# Patient Record
Sex: MALE | Race: WHITE | Employment: OTHER | RURAL
[De-identification: names, ages, dates, MRNs, and addresses within clinical notes are randomized per-mention and may not be internally consistent; named-entity substitution may affect disease eponyms.]

---

## 2017-03-09 ENCOUNTER — HOSPITAL ENCOUNTER (OUTPATIENT)
Dept: OTHER | Age: 52
Discharge: HOME | End: 2017-03-09
Attending: UROLOGY | Admitting: UROLOGY

## 2017-03-09 DIAGNOSIS — M25.551 PAIN OF BOTH HIP JOINTS: ICD-10-CM

## 2017-03-09 DIAGNOSIS — M25.552 PAIN OF BOTH HIP JOINTS: ICD-10-CM

## 2017-03-09 DIAGNOSIS — N31.1 REFLEX NEUROPATHIC BLADDER, NOT ELSEWHERE CLASSIFIED: ICD-10-CM

## 2017-03-09 LAB
A/G RATIO: 2.3 (ref 0.8–2)
ALBUMIN SERPL-MCNC: 4.8 G/DL (ref 3.4–4.8)
ALP BLD-CCNC: 111 U/L (ref 25–100)
ALT SERPL-CCNC: 23 U/L (ref 4–36)
ANION GAP SERPL CALCULATED.3IONS-SCNC: 10 MMOL/L (ref 3–16)
AST SERPL-CCNC: 20 U/L (ref 8–33)
BILIRUB SERPL-MCNC: 0.3 MG/DL (ref 0.3–1.2)
BUN BLDV-MCNC: 13 MG/DL (ref 6–20)
CALCIUM SERPL-MCNC: 9.6 MG/DL (ref 8.5–10.5)
CHLORIDE BLD-SCNC: 102 MMOL/L (ref 98–107)
CO2: 27 MMOL/L (ref 20–30)
CREAT SERPL-MCNC: 0.7 MG/DL (ref 0.4–1.2)
GFR AFRICAN AMERICAN: >59
GFR NON-AFRICAN AMERICAN: >59
GLOBULIN: 2.1 G/DL
GLUCOSE BLD-MCNC: 121 MG/DL (ref 74–106)
POTASSIUM SERPL-SCNC: 4.3 MMOL/L (ref 3.4–5.1)
PROSTATE SPECIFIC ANTIGEN: 0.2 NG/ML (ref 0–4)
SODIUM BLD-SCNC: 139 MMOL/L (ref 136–145)
TOTAL PROTEIN: 6.9 G/DL (ref 6.4–8.3)

## 2017-05-05 ENCOUNTER — HOSPITAL ENCOUNTER (OUTPATIENT)
Dept: OTHER | Age: 52
Discharge: HOME | End: 2017-05-05
Attending: INTERNAL MEDICINE | Admitting: INTERNAL MEDICINE

## 2017-05-05 LAB
A/G RATIO: 1.9 (ref 0.8–2)
ALBUMIN SERPL-MCNC: 4.7 G/DL (ref 3.4–4.8)
ALP BLD-CCNC: 116 U/L (ref 25–100)
ALT SERPL-CCNC: 29 U/L (ref 4–36)
ANION GAP SERPL CALCULATED.3IONS-SCNC: 14 MMOL/L (ref 3–16)
AST SERPL-CCNC: 25 U/L (ref 8–33)
BASOPHILS ABSOLUTE: 0 K/UL (ref 0–0.1)
BASOPHILS RELATIVE PERCENT: 0.7 %
BILIRUB SERPL-MCNC: 0.3 MG/DL (ref 0.3–1.2)
BILIRUBIN DIRECT: <0.2 MG/DL (ref 0–0.2)
BILIRUBIN, INDIRECT: NORMAL MG/DL (ref 0.2–0.8)
BUN BLDV-MCNC: 15 MG/DL (ref 6–20)
CALCIUM SERPL-MCNC: 9.7 MG/DL (ref 8.5–10.5)
CHLORIDE BLD-SCNC: 102 MMOL/L (ref 98–107)
CHOLESTEROL, TOTAL: 164 MG/DL (ref 0–200)
CO2: 24 MMOL/L (ref 20–30)
CREAT SERPL-MCNC: 0.7 MG/DL (ref 0.4–1.2)
EOSINOPHILS ABSOLUTE: 0.4 K/UL (ref 0–0.4)
EOSINOPHILS RELATIVE PERCENT: 6.8 %
FOLATE: >20 NG/ML
GFR AFRICAN AMERICAN: >59
GFR NON-AFRICAN AMERICAN: >59
GLOBULIN: 2.5 G/DL
GLUCOSE BLD-MCNC: 105 MG/DL (ref 74–106)
HCT VFR BLD CALC: 41.7 % (ref 40–54)
HDLC SERPL-MCNC: 37 MG/DL (ref 40–60)
HEMOGLOBIN: 14.6 G/DL (ref 13–18)
LDL CHOLESTEROL CALCULATED: 102 MG/DL
LYMPHOCYTES ABSOLUTE: 2 K/UL (ref 1.5–4)
LYMPHOCYTES RELATIVE PERCENT: 34.8 % (ref 20–40)
MCH RBC QN AUTO: 31.5 PG (ref 27–32)
MCHC RBC AUTO-ENTMCNC: 35 G/DL (ref 31–35)
MCV RBC AUTO: 89.9 FL (ref 80–100)
MONOCYTES ABSOLUTE: 0.8 K/UL (ref 0.2–0.8)
MONOCYTES RELATIVE PERCENT: 13.8 % (ref 3–10)
NEUTROPHILS ABSOLUTE: 2.6 K/UL (ref 2–7.5)
NEUTROPHILS RELATIVE PERCENT: 43.9 %
PDW BLD-RTO: 13.4 % (ref 11–16)
PLATELET # BLD: 260 K/UL (ref 150–400)
PMV BLD AUTO: 9.6 FL (ref 6–10)
POTASSIUM SERPL-SCNC: 4.3 MMOL/L (ref 3.4–5.1)
PROSTATE SPECIFIC ANTIGEN: 0.2 NG/ML (ref 0–4)
RBC # BLD: 4.64 M/UL (ref 4.5–6)
SODIUM BLD-SCNC: 140 MMOL/L (ref 136–145)
TOTAL PROTEIN: 7.2 G/DL (ref 6.4–8.3)
TRIGL SERPL-MCNC: 125 MG/DL (ref 0–249)
TSH SERPL DL<=0.05 MIU/L-ACNC: 3.38 UIU/ML (ref 0.35–5.5)
VITAMIN B-12: 423 PG/ML (ref 211–911)
VLDLC SERPL CALC-MCNC: 25 MG/DL
WBC # BLD: 5.9 K/UL (ref 4–11)

## 2017-08-01 ENCOUNTER — HOSPITAL ENCOUNTER (OUTPATIENT)
Dept: OTHER | Age: 52
Discharge: HOME | End: 2017-08-01
Attending: NURSE PRACTITIONER | Admitting: NURSE PRACTITIONER

## 2017-08-01 LAB
BASOPHILS ABSOLUTE: 0 K/UL (ref 0–0.1)
BASOPHILS RELATIVE PERCENT: 0.5 %
BILIRUBIN DIRECT: <0.2 MG/DL (ref 0–0.2)
BILIRUBIN, INDIRECT: NORMAL MG/DL (ref 0.2–0.8)
CHOLESTEROL, TOTAL: 134 MG/DL (ref 0–200)
EOSINOPHILS ABSOLUTE: 0.2 K/UL (ref 0–0.4)
EOSINOPHILS RELATIVE PERCENT: 3 %
FOLATE: >20 NG/ML
HCT VFR BLD CALC: 39.8 % (ref 40–54)
HDLC SERPL-MCNC: 40 MG/DL (ref 40–60)
HEMOGLOBIN: 13.8 G/DL (ref 13–18)
LDL CHOLESTEROL CALCULATED: 72 MG/DL
LYMPHOCYTES ABSOLUTE: 2.4 K/UL (ref 1.5–4)
LYMPHOCYTES RELATIVE PERCENT: 31.7 % (ref 20–40)
MCH RBC QN AUTO: 30.9 PG (ref 27–32)
MCHC RBC AUTO-ENTMCNC: 34.7 G/DL (ref 31–35)
MCV RBC AUTO: 89.2 FL (ref 80–100)
MONOCYTES ABSOLUTE: 0.9 K/UL (ref 0.2–0.8)
MONOCYTES RELATIVE PERCENT: 11.5 % (ref 3–10)
NEUTROPHILS ABSOLUTE: 4 K/UL (ref 2–7.5)
NEUTROPHILS RELATIVE PERCENT: 53.3 %
PDW BLD-RTO: 13.6 % (ref 11–16)
PLATELET # BLD: 260 K/UL (ref 150–400)
PMV BLD AUTO: 9.8 FL (ref 6–10)
RBC # BLD: 4.46 M/UL (ref 4.5–6)
TRIGL SERPL-MCNC: 109 MG/DL (ref 0–249)
TSH SERPL DL<=0.05 MIU/L-ACNC: 4.57 UIU/ML (ref 0.35–5.5)
VITAMIN B-12: 543 PG/ML (ref 211–911)
VLDLC SERPL CALC-MCNC: 22 MG/DL
WBC # BLD: 7.5 K/UL (ref 4–11)

## 2017-08-02 LAB
A/G RATIO: 1.8 (ref 0.8–2)
ALBUMIN SERPL-MCNC: 4.8 G/DL (ref 3.4–4.8)
ALP BLD-CCNC: 113 U/L (ref 25–100)
ALT SERPL-CCNC: 38 U/L (ref 4–36)
ANION GAP SERPL CALCULATED.3IONS-SCNC: 15 MMOL/L (ref 3–16)
AST SERPL-CCNC: 27 U/L (ref 8–33)
BILIRUB SERPL-MCNC: 0.4 MG/DL (ref 0.3–1.2)
BUN BLDV-MCNC: 12 MG/DL (ref 6–20)
CALCIUM SERPL-MCNC: 9.6 MG/DL (ref 8.5–10.5)
CHLORIDE BLD-SCNC: 100 MMOL/L (ref 98–107)
CO2: 26 MMOL/L (ref 20–30)
CREAT SERPL-MCNC: 0.8 MG/DL (ref 0.4–1.2)
GFR AFRICAN AMERICAN: >59
GFR NON-AFRICAN AMERICAN: >59
GLOBULIN: 2.6 G/DL
GLUCOSE BLD-MCNC: 112 MG/DL (ref 74–106)
POTASSIUM SERPL-SCNC: 4.9 MMOL/L (ref 3.4–5.1)
SODIUM BLD-SCNC: 141 MMOL/L (ref 136–145)
TOTAL PROTEIN: 7.4 G/DL (ref 6.4–8.3)

## 2017-11-20 ENCOUNTER — HOSPITAL ENCOUNTER (OUTPATIENT)
Dept: OTHER | Age: 52
Discharge: HOME | End: 2017-11-20
Attending: INTERNAL MEDICINE | Admitting: INTERNAL MEDICINE

## 2017-11-20 LAB
ALBUMIN SERPL-MCNC: 4.8 G/DL (ref 3.4–4.8)
ALP BLD-CCNC: 110 U/L (ref 25–100)
ALT SERPL-CCNC: 70 U/L (ref 4–36)
AST SERPL-CCNC: 38 U/L (ref 8–33)
BILIRUB SERPL-MCNC: 0.4 MG/DL (ref 0.3–1.2)
BILIRUBIN DIRECT: <0.2 MG/DL (ref 0–0.2)
BILIRUBIN, INDIRECT: ABNORMAL MG/DL (ref 0.2–0.8)
CHOLESTEROL, TOTAL: 166 MG/DL (ref 0–200)
HDLC SERPL-MCNC: 42 MG/DL (ref 40–60)
LDL CHOLESTEROL CALCULATED: 103 MG/DL
TOTAL PROTEIN: 7.4 G/DL (ref 6.4–8.3)
TRIGL SERPL-MCNC: 103 MG/DL (ref 0–249)
VLDLC SERPL CALC-MCNC: 21 MG/DL

## 2017-12-28 ENCOUNTER — HOSPITAL ENCOUNTER (OUTPATIENT)
Dept: OTHER | Age: 52
Discharge: HOME | End: 2017-12-28
Attending: UROLOGY | Admitting: UROLOGY

## 2017-12-28 DIAGNOSIS — C67.0 MALIGNANT NEOPLASM OF TRIGONE OF URINARY BLADDER (HCC): ICD-10-CM

## 2017-12-28 LAB
A/G RATIO: 1.8 (ref 0.8–2)
ALBUMIN SERPL-MCNC: 4.5 G/DL (ref 3.4–4.8)
ALP BLD-CCNC: 101 U/L (ref 25–100)
ALT SERPL-CCNC: 49 U/L (ref 4–36)
ANION GAP SERPL CALCULATED.3IONS-SCNC: 10 MMOL/L (ref 3–16)
AST SERPL-CCNC: 26 U/L (ref 8–33)
BILIRUB SERPL-MCNC: 0.3 MG/DL (ref 0.3–1.2)
BUN BLDV-MCNC: 13 MG/DL (ref 6–20)
CALCIUM SERPL-MCNC: 9.1 MG/DL (ref 8.5–10.5)
CHLORIDE BLD-SCNC: 99 MMOL/L (ref 98–107)
CO2: 29 MMOL/L (ref 20–30)
CREAT SERPL-MCNC: 0.7 MG/DL (ref 0.4–1.2)
GFR AFRICAN AMERICAN: >59
GFR NON-AFRICAN AMERICAN: >59
GLOBULIN: 2.5 G/DL
GLUCOSE BLD-MCNC: 114 MG/DL (ref 74–106)
POTASSIUM SERPL-SCNC: 4.3 MMOL/L (ref 3.4–5.1)
PROSTATE SPECIFIC ANTIGEN: 0.19 NG/ML (ref 0–4)
SODIUM BLD-SCNC: 138 MMOL/L (ref 136–145)
TOTAL PROTEIN: 7 G/DL (ref 6.4–8.3)

## 2018-01-17 ENCOUNTER — HOSPITAL ENCOUNTER (OUTPATIENT)
Dept: OTHER | Age: 53
Discharge: HOME | End: 2018-01-17
Attending: INTERNAL MEDICINE | Admitting: INTERNAL MEDICINE

## 2018-01-17 LAB
ALBUMIN SERPL-MCNC: 4.6 G/DL (ref 3.4–4.8)
ALP BLD-CCNC: 117 U/L (ref 25–100)
ALT SERPL-CCNC: 35 U/L (ref 4–36)
AST SERPL-CCNC: 24 U/L (ref 8–33)
BILIRUB SERPL-MCNC: 0.5 MG/DL (ref 0.3–1.2)
BILIRUBIN DIRECT: <0.2 MG/DL (ref 0–0.2)
BILIRUBIN, INDIRECT: ABNORMAL MG/DL (ref 0.2–0.8)
CHOLESTEROL, TOTAL: 133 MG/DL (ref 0–200)
HDLC SERPL-MCNC: 37 MG/DL (ref 40–60)
LDL CHOLESTEROL CALCULATED: 80 MG/DL
TOTAL PROTEIN: 7.1 G/DL (ref 6.4–8.3)
TRIGL SERPL-MCNC: 82 MG/DL (ref 0–249)
VLDLC SERPL CALC-MCNC: 16 MG/DL

## 2018-04-16 ENCOUNTER — HOSPITAL ENCOUNTER (OUTPATIENT)
Dept: OTHER | Age: 53
Discharge: HOME | End: 2018-04-16
Attending: UROLOGY | Admitting: UROLOGY

## 2018-04-16 DIAGNOSIS — N21.1: ICD-10-CM

## 2018-04-16 LAB
A/G RATIO: 2.1 (ref 0.8–2)
ALBUMIN SERPL-MCNC: 4.5 G/DL (ref 3.4–4.8)
ALP BLD-CCNC: 109 U/L (ref 25–100)
ALT SERPL-CCNC: 33 U/L (ref 4–36)
ANION GAP SERPL CALCULATED.3IONS-SCNC: 14 MMOL/L (ref 3–16)
AST SERPL-CCNC: 21 U/L (ref 8–33)
BILIRUB SERPL-MCNC: 0.3 MG/DL (ref 0.3–1.2)
BUN BLDV-MCNC: 14 MG/DL (ref 6–20)
CALCIUM SERPL-MCNC: 9.1 MG/DL (ref 8.5–10.5)
CHLORIDE BLD-SCNC: 102 MMOL/L (ref 98–107)
CO2: 25 MMOL/L (ref 20–30)
CREAT SERPL-MCNC: 0.7 MG/DL (ref 0.4–1.2)
GFR AFRICAN AMERICAN: >59
GFR NON-AFRICAN AMERICAN: >59
GLOBULIN: 2.1 G/DL
GLUCOSE BLD-MCNC: 115 MG/DL (ref 74–106)
POTASSIUM SERPL-SCNC: 4 MMOL/L (ref 3.4–5.1)
SODIUM BLD-SCNC: 141 MMOL/L (ref 136–145)
TOTAL PROTEIN: 6.6 G/DL (ref 6.4–8.3)

## 2018-05-08 ENCOUNTER — HOSPITAL ENCOUNTER (OUTPATIENT)
Dept: OTHER | Age: 53
Discharge: HOME | End: 2018-05-08
Attending: UROLOGY | Admitting: UROLOGY

## 2018-05-08 LAB
BILIRUBIN URINE: NEGATIVE
BLOOD, URINE: NEGATIVE
CLARITY: CLEAR
COLOR: YELLOW
GLUCOSE URINE: NEGATIVE MG/DL
KETONES, URINE: NEGATIVE MG/DL
LEUKOCYTE ESTERASE, URINE: NEGATIVE
MICROSCOPIC EXAMINATION: NORMAL
NITRITE, URINE: NEGATIVE
PH UA: 5.5
PROTEIN UA: NEGATIVE MG/DL
SPECIFIC GRAVITY UA: 1.02
URINE TYPE: NORMAL
UROBILINOGEN, URINE: 0.2 E.U./DL

## 2018-05-10 LAB — URINE CULTURE, ROUTINE: NORMAL

## 2018-05-23 RX ORDER — ATORVASTATIN CALCIUM 20 MG/1
20 TABLET, FILM COATED ORAL DAILY
COMMUNITY

## 2018-05-24 ENCOUNTER — OFFICE VISIT (OUTPATIENT)
Dept: SURGERY | Facility: CLINIC | Age: 53
End: 2018-05-24

## 2018-05-24 VITALS
OXYGEN SATURATION: 99 % | DIASTOLIC BLOOD PRESSURE: 89 MMHG | BODY MASS INDEX: 31.48 KG/M2 | HEART RATE: 61 BPM | WEIGHT: 195.9 LBS | HEIGHT: 66 IN | SYSTOLIC BLOOD PRESSURE: 134 MMHG | TEMPERATURE: 97.6 F

## 2018-05-24 DIAGNOSIS — D49.2 SKIN NEOPLASM: Primary | ICD-10-CM

## 2018-05-24 PROCEDURE — 99203 OFFICE O/P NEW LOW 30 MIN: CPT | Performed by: SURGERY

## 2018-05-24 RX ORDER — IBUPROFEN 400 MG/1
TABLET ORAL
Refills: 0 | COMMUNITY
Start: 2018-03-07

## 2018-05-24 RX ORDER — HYDROCODONE BITARTRATE AND ACETAMINOPHEN 10; 325 MG/1; MG/1
TABLET ORAL
Refills: 0 | COMMUNITY
Start: 2018-04-21

## 2018-05-24 RX ORDER — LISINOPRIL 5 MG/1
5 TABLET ORAL DAILY
Refills: 0 | COMMUNITY
Start: 2018-05-03

## 2018-05-24 RX ORDER — PREDNISONE 1 MG/1
TABLET ORAL
Refills: 0 | COMMUNITY
Start: 2018-04-26

## 2018-05-24 RX ORDER — METOPROLOL SUCCINATE 25 MG/1
25 TABLET, EXTENDED RELEASE ORAL DAILY
Refills: 0 | COMMUNITY
Start: 2018-05-03

## 2018-05-24 RX ORDER — FINASTERIDE 5 MG/1
TABLET, FILM COATED ORAL
Refills: 0 | COMMUNITY
Start: 2018-04-26

## 2018-05-24 RX ORDER — ASPIRIN 81 MG/1
81 TABLET ORAL DAILY
COMMUNITY

## 2018-05-24 RX ORDER — ATORVASTATIN CALCIUM 80 MG/1
80 TABLET, FILM COATED ORAL
Refills: 0 | COMMUNITY
Start: 2018-05-03

## 2018-05-24 RX ORDER — DIAZEPAM 10 MG/1
TABLET ORAL
Refills: 0 | COMMUNITY
Start: 2018-05-16

## 2018-05-24 RX ORDER — COLCHICINE 0.6 MG/1
TABLET ORAL
Refills: 0 | COMMUNITY
Start: 2018-03-20

## 2018-05-24 RX ORDER — OMEPRAZOLE 40 MG/1
40 CAPSULE, DELAYED RELEASE ORAL DAILY
Refills: 0 | COMMUNITY
Start: 2018-04-26

## 2018-05-24 NOTE — PROGRESS NOTES
Patient: Buzz Cox    YOB: 1965    Date: 05/24/2018    Primary Care Provider: AHSAN Martin    Chief Complaint   Patient presents with   • Skin Lesion       Subjective .     History of present illness:  The patient is in the office for evaluation of a lesion on the right side of his scalp just at his hairline.  It has been thee 1 year.  He denies increase in size.  His hairdresser has hit it a couple of times and it is very painful when that happens.      The following portions of the patient's history were reviewed and updated as appropriate: allergies, current medications, past family history, past medical history, past social history, past surgical history and problem list.      Review of Systems   Constitutional: Negative for chills, fever and unexpected weight change.   HENT: Negative for trouble swallowing and voice change.    Eyes: Negative for visual disturbance.   Respiratory: Negative for apnea, cough, chest tightness, shortness of breath and wheezing.    Cardiovascular: Negative for chest pain, palpitations and leg swelling.   Gastrointestinal: Negative for abdominal distention, abdominal pain, anal bleeding, blood in stool, constipation, diarrhea, nausea, rectal pain and vomiting.   Endocrine: Negative for cold intolerance and heat intolerance.   Genitourinary: Negative for difficulty urinating, dysuria, flank pain, scrotal swelling and testicular pain.   Musculoskeletal: Negative for back pain, gait problem and joint swelling.   Skin: Negative for color change, rash and wound.   Neurological: Negative for dizziness, syncope, speech difficulty, weakness, numbness and headaches.   Hematological: Negative for adenopathy. Does not bruise/bleed easily.   Psychiatric/Behavioral: Negative for confusion. The patient is not nervous/anxious.        Allergies:  No Known Allergies    Medications:    Current Outpatient Prescriptions:   •  aspirin 81 MG EC tablet, Take 81 mg by mouth Daily.,  "Disp: , Rfl:   •  finasteride (PROSCAR) 5 MG tablet, , Disp: , Rfl: 0  •  lisinopril (PRINIVIL,ZESTRIL) 5 MG tablet, Take 5 mg by mouth Daily., Disp: , Rfl: 0  •  metoprolol succinate XL (TOPROL-XL) 25 MG 24 hr tablet, Take 25 mg by mouth Daily., Disp: , Rfl: 0  •  omeprazole (priLOSEC) 40 MG capsule, Take 40 mg by mouth Daily., Disp: , Rfl: 0  •  predniSONE (DELTASONE) 5 MG tablet, , Disp: , Rfl: 0  •  atorvastatin (LIPITOR) 20 MG tablet, Take 20 mg by mouth Daily., Disp: , Rfl:   •  atorvastatin (LIPITOR) 80 MG tablet, Take 80 mg by mouth every night at bedtime., Disp: , Rfl: 0  •  colchicine 0.6 MG tablet, , Disp: , Rfl: 0  •  diazePAM (VALIUM) 10 MG tablet, take 1 tablet by mouth every 6 hours if needed for BACK PAIN. MAY TAKE 1/2 OR 1 TABLET AS NEEDED, Disp: , Rfl: 0  •  HYDROcodone-acetaminophen (NORCO)  MG per tablet, take 1 to 2 tablets by mouth 30 MINUTES prior to procedure then as directed THEREAFTER, Disp: , Rfl: 0  •  ibuprofen (ADVIL,MOTRIN) 400 MG tablet, , Disp: , Rfl: 0    History\"  Past Medical History:   Diagnosis Date   • Arthritis    • CAD (coronary artery disease)    • Cancer    • Hyperlipidemia    • Hypertension    • Myocardial infarction        Past Surgical History:   Procedure Laterality Date   • BLADDER TUMOR EXCISION     • CARDIAC SURGERY     • CORONARY ANGIOPLASTY WITH STENT PLACEMENT         Family History   Problem Relation Age of Onset   • No Known Problems Mother    • No Known Problems Father        Social History   Substance Use Topics   • Smoking status: Former Smoker   • Smokeless tobacco: Never Used   • Alcohol use No        Objective     Vital Signs:   Vitals:    05/24/18 1005   BP: 134/89   Pulse: 61   Temp: 97.6 °F (36.4 °C)   TempSrc: Temporal Artery    SpO2: 99%   Weight: 88.9 kg (195 lb 14.4 oz)   Height: 167.6 cm (66\")       Physical Exam:   General Appearance:    Alert, cooperative, in no acute distress   Head:    Normocephalic, without obvious abnormality, " atraumatic. 1 cm ulcerated raised lesion right 4 head    Eyes:            Lids and lashes normal, conjunctivae and sclerae normal, no   icterus, no pallor, corneas clear, PERRLA   Ears:    Ears appear intact with no abnormalities noted   Throat:   No oral lesions, no thrush, oral mucosa moist   Neck:   No adenopathy, supple, trachea midline, no thyromegaly, no   carotid bruit, no JVD   Lungs:     Clear to auscultation,respirations regular, even and                  unlabored    Heart:    Regular rhythm and normal rate, normal S1 and S2, no            murmur, no gallop, no rub, no click   Chest Wall:    No abnormalities observed   Abdomen:     Normal bowel sounds, no masses, no organomegaly, soft        non-tender, non-distended, no guarding, no rebound                tenderness   Extremities:   Moves all extremities well, no edema, no cyanosis, no             redness   Pulses:   Pulses palpable and equal bilaterally   Skin:   No bleeding, bruising or rash   Lymph nodes:   No palpable adenopathy   Neurologic:   Cranial nerves 2 - 12 grossly intact, sensation intact, DTR       present and equal bilaterally     Results Review:   I reviewed the patient's new clinical results.    Assessment/Plan     1. Skin neoplasm        He has excision of skin lesion right scalp. Risk of bleeding, infection recurrence discussed and patient agreeable    I discussed the patients findings and my recommendations with patient    Review of Systems was reviewed and confirmed as accurate today.    Electronically signed by Ariel Quintanilla MD  05/24/18      .Scribed for Ariel Quintanilla MD by Michelle Milligan. 5/24/2018  10:26 AM

## 2018-05-30 NOTE — PROGRESS NOTES
Location: scalp lesion     Procedure: excision scalp lesion, 1.2 cm with primary closure      I recommend excision. Procedure and the risks and benefits were explained including bleeding and infection. The patient understands these and wishes to proceed.     The patient was brought to the procedure room. Consent and time out were performed. The area was prepped and draped in the usual fashion. 1% lidocaine with epinephrine was infused locally. An ellyptical incision was made around the lesion. Full thickness excision was performed. The lesion size was 1.2 cm. The wound was closed with Nylon for the skin. Wound closure size was 2 cm. There were no complications and the patient tolerated the procedure well. Hemostasis was well controlled with pressure and there was minimal blood loss. Wound instructions were given.

## 2018-05-31 ENCOUNTER — PROCEDURE VISIT (OUTPATIENT)
Dept: SURGERY | Facility: CLINIC | Age: 53
End: 2018-05-31

## 2018-05-31 VITALS
BODY MASS INDEX: 31.5 KG/M2 | HEART RATE: 58 BPM | DIASTOLIC BLOOD PRESSURE: 68 MMHG | WEIGHT: 196 LBS | OXYGEN SATURATION: 98 % | TEMPERATURE: 97.4 F | SYSTOLIC BLOOD PRESSURE: 110 MMHG | HEIGHT: 66 IN

## 2018-05-31 DIAGNOSIS — D49.2 SKIN NEOPLASM: Primary | ICD-10-CM

## 2018-05-31 DIAGNOSIS — L98.9 LESION OF SKIN OF SCALP: Primary | ICD-10-CM

## 2018-05-31 PROCEDURE — 11442 EXC FACE-MM B9+MARG 1.1-2 CM: CPT | Performed by: SURGERY

## 2018-06-07 ENCOUNTER — OFFICE VISIT (OUTPATIENT)
Dept: SURGERY | Facility: CLINIC | Age: 53
End: 2018-06-07

## 2018-06-07 VITALS
TEMPERATURE: 97.8 F | SYSTOLIC BLOOD PRESSURE: 141 MMHG | BODY MASS INDEX: 31.5 KG/M2 | OXYGEN SATURATION: 99 % | HEIGHT: 66 IN | WEIGHT: 195.99 LBS | HEART RATE: 62 BPM | DIASTOLIC BLOOD PRESSURE: 88 MMHG

## 2018-06-07 DIAGNOSIS — Z48.89 POSTOPERATIVE VISIT: Primary | ICD-10-CM

## 2018-06-07 PROCEDURE — 99024 POSTOP FOLLOW-UP VISIT: CPT | Performed by: SURGERY

## 2018-06-07 NOTE — PROGRESS NOTES
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  They state that they have done well and are having no problems.      Vital Signs:  Vitals:    06/07/18 0844   BP: 141/88   Pulse: 62   Temp: 97.8 °F (36.6 °C)   SpO2: 99%   Weight: 88.9 kg (195 lb 15.8 oz)   Height: 167.6 cm (65.98\")       Physical Exam:   General Appearance:    Alert, cooperative, in no acute distress, wound clean dry without infection   Abdomen:     no masses, no organomegaly, soft non-tender, non-distended, no guarding, wounds are well healed   Chest:      Clear to ausculation     Results Review:   I reviewed the patient's new clinical results.    Assessment / Plan:    1. Postoperative visit        I did discuss the situation with the patient today in the office and they have done well from their recent lesion excision, I don't think that the patient needs any further intervention and I need to see them back only if they have further problems. Pathology report was reviewed with the patient in the office.    Electronically signed by Ariel Quintanilla MD  06/07/18      Portions of this note has been scribed for Ariel Quintanilla MD by Monica Loco. 6/7/2018  10:31 AM                "

## 2018-07-27 ENCOUNTER — HOSPITAL ENCOUNTER (OUTPATIENT)
Facility: HOSPITAL | Age: 53
Discharge: HOME | End: 2018-07-27
Payer: COMMERCIAL

## 2018-07-27 LAB
A/G RATIO: 2 (ref 0.8–2)
ALBUMIN SERPL-MCNC: 4.9 G/DL (ref 3.4–4.8)
ALP BLD-CCNC: 97 U/L (ref 25–100)
ALT SERPL-CCNC: 24 U/L (ref 4–36)
ANION GAP SERPL CALCULATED.3IONS-SCNC: 16 MMOL/L (ref 3–16)
AST SERPL-CCNC: 20 U/L (ref 8–33)
BASOPHILS ABSOLUTE: 0.1 K/UL (ref 0–0.1)
BASOPHILS RELATIVE PERCENT: 1 %
BILIRUB SERPL-MCNC: 0.5 MG/DL (ref 0.3–1.2)
BILIRUBIN DIRECT: <0.2 MG/DL (ref 0–0.2)
BILIRUBIN, INDIRECT: NORMAL MG/DL (ref 0.2–0.8)
BUN BLDV-MCNC: 10 MG/DL (ref 6–20)
CALCIUM SERPL-MCNC: 9.9 MG/DL (ref 8.5–10.5)
CHLORIDE BLD-SCNC: 98 MMOL/L (ref 98–107)
CHOLESTEROL, TOTAL: 148 MG/DL (ref 0–200)
CO2: 25 MMOL/L (ref 20–30)
CREAT SERPL-MCNC: 0.8 MG/DL (ref 0.4–1.2)
EOSINOPHILS ABSOLUTE: 0.2 K/UL (ref 0–0.4)
EOSINOPHILS RELATIVE PERCENT: 1.9 %
FOLATE: >20 NG/ML
GFR AFRICAN AMERICAN: >59
GFR NON-AFRICAN AMERICAN: >59
GLOBULIN: 2.4 G/DL
GLUCOSE BLD-MCNC: 107 MG/DL (ref 74–106)
HCT VFR BLD CALC: 43.9 % (ref 40–54)
HDLC SERPL-MCNC: 45 MG/DL (ref 40–60)
HEMOGLOBIN: 13.9 G/DL (ref 13–18)
IMMATURE GRANULOCYTES #: 0.1 K/UL
IMMATURE GRANULOCYTES %: 0.7 % (ref 0–5)
LDL CHOLESTEROL CALCULATED: 68 MG/DL
LYMPHOCYTES ABSOLUTE: 2.6 K/UL (ref 1.5–4)
LYMPHOCYTES RELATIVE PERCENT: 28 %
MCH RBC QN AUTO: 30 PG (ref 27–32)
MCHC RBC AUTO-ENTMCNC: 31.7 G/DL (ref 31–35)
MCV RBC AUTO: 94.8 FL (ref 80–100)
MONOCYTES ABSOLUTE: 0.8 K/UL (ref 0.2–0.8)
MONOCYTES RELATIVE PERCENT: 8.6 %
NEUTROPHILS ABSOLUTE: 5.6 K/UL (ref 2–7.5)
NEUTROPHILS RELATIVE PERCENT: 59.8 %
PDW BLD-RTO: 13.4 % (ref 11–16)
PLATELET # BLD: 304 K/UL (ref 150–400)
PMV BLD AUTO: 10.2 FL (ref 6–10)
POTASSIUM SERPL-SCNC: 4.1 MMOL/L (ref 3.4–5.1)
PROSTATE SPECIFIC ANTIGEN: 0.26 NG/ML (ref 0–4)
RBC # BLD: 4.63 M/UL (ref 4.5–6)
SODIUM BLD-SCNC: 139 MMOL/L (ref 136–145)
TOTAL PROTEIN: 7.3 G/DL (ref 6.4–8.3)
TRIGL SERPL-MCNC: 174 MG/DL (ref 0–249)
TSH SERPL DL<=0.05 MIU/L-ACNC: 4.31 UIU/ML (ref 0.35–5.5)
VITAMIN B-12: 460 PG/ML (ref 211–911)
VLDLC SERPL CALC-MCNC: 35 MG/DL
WBC # BLD: 9.4 K/UL (ref 4–11)

## 2018-07-27 PROCEDURE — 85025 COMPLETE CBC W/AUTO DIFF WBC: CPT

## 2018-07-27 PROCEDURE — 82607 VITAMIN B-12: CPT

## 2018-07-27 PROCEDURE — 82746 ASSAY OF FOLIC ACID SERUM: CPT

## 2018-07-27 PROCEDURE — 36415 COLL VENOUS BLD VENIPUNCTURE: CPT

## 2018-07-27 PROCEDURE — 80053 COMPREHEN METABOLIC PANEL: CPT

## 2018-07-27 PROCEDURE — 82248 BILIRUBIN DIRECT: CPT

## 2018-07-27 PROCEDURE — 80061 LIPID PANEL: CPT

## 2018-07-27 PROCEDURE — 84443 ASSAY THYROID STIM HORMONE: CPT

## 2018-07-27 PROCEDURE — G0103 PSA SCREENING: HCPCS

## 2018-10-08 ENCOUNTER — HOSPITAL ENCOUNTER (OUTPATIENT)
Facility: HOSPITAL | Age: 53
Discharge: HOME | End: 2018-10-08
Payer: COMMERCIAL

## 2018-10-08 LAB
A/G RATIO: 2.4 (ref 0.8–2)
ALBUMIN SERPL-MCNC: 4.5 G/DL (ref 3.4–4.8)
ALP BLD-CCNC: 60 U/L (ref 25–100)
ALT SERPL-CCNC: 31 U/L (ref 4–36)
ANION GAP SERPL CALCULATED.3IONS-SCNC: 11 MMOL/L (ref 3–16)
AST SERPL-CCNC: 19 U/L (ref 8–33)
BILIRUB SERPL-MCNC: 0.3 MG/DL (ref 0.3–1.2)
BUN BLDV-MCNC: 9 MG/DL (ref 6–20)
CALCIUM SERPL-MCNC: 9.2 MG/DL (ref 8.5–10.5)
CHLORIDE BLD-SCNC: 103 MMOL/L (ref 98–107)
CO2: 26 MMOL/L (ref 20–30)
CREAT SERPL-MCNC: 0.7 MG/DL (ref 0.4–1.2)
GFR AFRICAN AMERICAN: >59
GFR NON-AFRICAN AMERICAN: >59
GLOBULIN: 1.9 G/DL
GLUCOSE BLD-MCNC: 105 MG/DL (ref 74–106)
POTASSIUM SERPL-SCNC: 3.9 MMOL/L (ref 3.4–5.1)
PROSTATE SPECIFIC ANTIGEN: 0.25 NG/ML (ref 0–4)
SODIUM BLD-SCNC: 140 MMOL/L (ref 136–145)
TOTAL PROTEIN: 6.4 G/DL (ref 6.4–8.3)

## 2018-10-08 PROCEDURE — 36415 COLL VENOUS BLD VENIPUNCTURE: CPT

## 2018-10-08 PROCEDURE — 80053 COMPREHEN METABOLIC PANEL: CPT

## 2019-01-29 ENCOUNTER — HOSPITAL ENCOUNTER (OUTPATIENT)
Facility: HOSPITAL | Age: 54
Discharge: HOME | End: 2019-01-29
Payer: COMMERCIAL

## 2019-01-29 LAB
A/G RATIO: 2.4 (ref 0.8–2)
ALBUMIN SERPL-MCNC: 4.7 G/DL (ref 3.4–4.8)
ALP BLD-CCNC: 81 U/L (ref 25–100)
ALT SERPL-CCNC: 27 U/L (ref 4–36)
ANION GAP SERPL CALCULATED.3IONS-SCNC: 13 MMOL/L (ref 3–16)
AST SERPL-CCNC: 21 U/L (ref 8–33)
BASOPHILS ABSOLUTE: 0.1 K/UL (ref 0–0.1)
BASOPHILS RELATIVE PERCENT: 0.5 %
BILIRUB SERPL-MCNC: 0.5 MG/DL (ref 0.3–1.2)
BILIRUBIN DIRECT: <0.2 MG/DL (ref 0–0.2)
BUN BLDV-MCNC: 12 MG/DL (ref 6–20)
CALCIUM SERPL-MCNC: 9.4 MG/DL (ref 8.5–10.5)
CHLORIDE BLD-SCNC: 99 MMOL/L (ref 98–107)
CHOLESTEROL, TOTAL: 133 MG/DL (ref 0–200)
CO2: 25 MMOL/L (ref 20–30)
CREAT SERPL-MCNC: 0.8 MG/DL (ref 0.4–1.2)
EOSINOPHILS ABSOLUTE: 0.1 K/UL (ref 0–0.4)
EOSINOPHILS RELATIVE PERCENT: 1.5 %
GFR AFRICAN AMERICAN: >59
GFR NON-AFRICAN AMERICAN: >59
GLOBULIN: 2 G/DL
GLUCOSE BLD-MCNC: 97 MG/DL (ref 74–106)
HCT VFR BLD CALC: 41.5 % (ref 40–54)
HDLC SERPL-MCNC: 46 MG/DL (ref 40–60)
HEMOGLOBIN: 13.8 G/DL (ref 13–18)
IMMATURE GRANULOCYTES #: 0 K/UL
IMMATURE GRANULOCYTES %: 0.4 % (ref 0–5)
LDL CHOLESTEROL CALCULATED: 64 MG/DL
LYMPHOCYTES ABSOLUTE: 2.5 K/UL (ref 1.5–4)
LYMPHOCYTES RELATIVE PERCENT: 26.8 %
MCH RBC QN AUTO: 31.3 PG (ref 27–32)
MCHC RBC AUTO-ENTMCNC: 33.3 G/DL (ref 31–35)
MCV RBC AUTO: 94.1 FL (ref 80–100)
MONOCYTES ABSOLUTE: 0.9 K/UL (ref 0.2–0.8)
MONOCYTES RELATIVE PERCENT: 9.5 %
NEUTROPHILS ABSOLUTE: 5.7 K/UL (ref 2–7.5)
NEUTROPHILS RELATIVE PERCENT: 61.3 %
PDW BLD-RTO: 12.7 % (ref 11–16)
PLATELET # BLD: 268 K/UL (ref 150–400)
PMV BLD AUTO: 9.8 FL (ref 6–10)
POTASSIUM SERPL-SCNC: 4.1 MMOL/L (ref 3.4–5.1)
RBC # BLD: 4.41 M/UL (ref 4.5–6)
SODIUM BLD-SCNC: 137 MMOL/L (ref 136–145)
TOTAL PROTEIN: 6.7 G/DL (ref 6.4–8.3)
TRIGL SERPL-MCNC: 115 MG/DL (ref 0–249)
VLDLC SERPL CALC-MCNC: 23 MG/DL
WBC # BLD: 9.4 K/UL (ref 4–11)

## 2019-01-29 PROCEDURE — 80053 COMPREHEN METABOLIC PANEL: CPT

## 2019-01-29 PROCEDURE — 80061 LIPID PANEL: CPT

## 2019-01-29 PROCEDURE — 82248 BILIRUBIN DIRECT: CPT

## 2019-01-29 PROCEDURE — 85025 COMPLETE CBC W/AUTO DIFF WBC: CPT

## 2019-02-26 ENCOUNTER — HOSPITAL ENCOUNTER (OUTPATIENT)
Facility: HOSPITAL | Age: 54
Discharge: HOME | End: 2019-02-26
Payer: COMMERCIAL

## 2019-02-26 LAB
HAV IGM SER IA-ACNC: NORMAL
HEPATITIS B CORE IGM ANTIBODY: NORMAL
HEPATITIS B SURFACE ANTIGEN INTERPRETATION: NORMAL
HEPATITIS C ANTIBODY INTERPRETATION: NORMAL

## 2019-02-26 PROCEDURE — 80074 ACUTE HEPATITIS PANEL: CPT

## 2019-02-26 PROCEDURE — 36415 COLL VENOUS BLD VENIPUNCTURE: CPT

## 2019-03-06 ENCOUNTER — HOSPITAL ENCOUNTER (OUTPATIENT)
Facility: HOSPITAL | Age: 54
Discharge: HOME | End: 2019-03-06
Payer: COMMERCIAL

## 2019-03-06 LAB
RAPID INFLUENZA  B AGN: NEGATIVE
RAPID INFLUENZA A AGN: NEGATIVE

## 2019-03-06 PROCEDURE — 87804 INFLUENZA ASSAY W/OPTIC: CPT

## 2019-04-03 ENCOUNTER — HOSPITAL ENCOUNTER (OUTPATIENT)
Facility: HOSPITAL | Age: 54
Discharge: HOME | End: 2019-04-03
Payer: COMMERCIAL

## 2019-04-03 ENCOUNTER — HOSPITAL ENCOUNTER (OUTPATIENT)
Dept: GENERAL RADIOLOGY | Facility: HOSPITAL | Age: 54
Discharge: HOME | End: 2019-04-03
Payer: COMMERCIAL

## 2019-04-03 DIAGNOSIS — N40.1 ENLARGED PROSTATE WITH URINARY OBSTRUCTION: ICD-10-CM

## 2019-04-03 DIAGNOSIS — N13.8 ENLARGED PROSTATE WITH URINARY OBSTRUCTION: ICD-10-CM

## 2019-04-03 LAB
A/G RATIO: 2 (ref 0.8–2)
ALBUMIN SERPL-MCNC: 4.2 G/DL (ref 3.4–4.8)
ALP BLD-CCNC: 69 U/L (ref 25–100)
ALT SERPL-CCNC: 26 U/L (ref 4–36)
ANION GAP SERPL CALCULATED.3IONS-SCNC: 11 MMOL/L (ref 3–16)
AST SERPL-CCNC: 19 U/L (ref 8–33)
BILIRUB SERPL-MCNC: 0.3 MG/DL (ref 0.3–1.2)
BUN BLDV-MCNC: 16 MG/DL (ref 6–20)
CALCIUM SERPL-MCNC: 9.1 MG/DL (ref 8.5–10.5)
CHLORIDE BLD-SCNC: 106 MMOL/L (ref 98–107)
CO2: 25 MMOL/L (ref 20–30)
CREAT SERPL-MCNC: 0.7 MG/DL (ref 0.4–1.2)
GFR AFRICAN AMERICAN: >59
GFR NON-AFRICAN AMERICAN: >59
GLOBULIN: 2.1 G/DL
GLUCOSE BLD-MCNC: 110 MG/DL (ref 74–106)
POTASSIUM SERPL-SCNC: 4.3 MMOL/L (ref 3.4–5.1)
PROSTATE SPECIFIC ANTIGEN: 0.14 NG/ML (ref 0–4)
SODIUM BLD-SCNC: 142 MMOL/L (ref 136–145)
TOTAL PROTEIN: 6.3 G/DL (ref 6.4–8.3)

## 2019-04-03 PROCEDURE — 74018 RADEX ABDOMEN 1 VIEW: CPT

## 2019-04-03 PROCEDURE — 84153 ASSAY OF PSA TOTAL: CPT

## 2019-04-03 PROCEDURE — 80053 COMPREHEN METABOLIC PANEL: CPT

## 2019-04-03 PROCEDURE — 36415 COLL VENOUS BLD VENIPUNCTURE: CPT

## 2019-05-02 ENCOUNTER — HOSPITAL ENCOUNTER (OUTPATIENT)
Facility: HOSPITAL | Age: 54
Discharge: HOME | End: 2019-05-02
Payer: COMMERCIAL

## 2019-05-02 LAB
A/G RATIO: 1.9 (ref 0.8–2)
ALBUMIN SERPL-MCNC: 4.5 G/DL (ref 3.4–4.8)
ALP BLD-CCNC: 94 U/L (ref 25–100)
ALT SERPL-CCNC: 32 U/L (ref 4–36)
ANION GAP SERPL CALCULATED.3IONS-SCNC: 12 MMOL/L (ref 3–16)
AST SERPL-CCNC: 26 U/L (ref 8–33)
BILIRUB SERPL-MCNC: 0.3 MG/DL (ref 0.3–1.2)
BILIRUBIN DIRECT: <0.2 MG/DL (ref 0–0.2)
BUN BLDV-MCNC: 14 MG/DL (ref 6–20)
CALCIUM SERPL-MCNC: 9.4 MG/DL (ref 8.5–10.5)
CHLORIDE BLD-SCNC: 103 MMOL/L (ref 98–107)
CHOLESTEROL, TOTAL: 117 MG/DL (ref 0–200)
CO2: 23 MMOL/L (ref 20–30)
CREAT SERPL-MCNC: 0.8 MG/DL (ref 0.4–1.2)
GFR AFRICAN AMERICAN: >59
GFR NON-AFRICAN AMERICAN: >59
GLOBULIN: 2.4 G/DL
GLUCOSE BLD-MCNC: 115 MG/DL (ref 74–106)
HCT VFR BLD CALC: 40.4 % (ref 40–54)
HDLC SERPL-MCNC: 36 MG/DL (ref 40–60)
HEMOGLOBIN: 13.7 G/DL (ref 13–18)
LDL CHOLESTEROL CALCULATED: 66 MG/DL
MCH RBC QN AUTO: 31.1 PG (ref 27–32)
MCHC RBC AUTO-ENTMCNC: 33.9 G/DL (ref 31–35)
MCV RBC AUTO: 91.6 FL (ref 80–100)
PDW BLD-RTO: 13 % (ref 11–16)
PLATELET # BLD: 263 K/UL (ref 150–400)
PMV BLD AUTO: 9.7 FL (ref 6–10)
POTASSIUM SERPL-SCNC: 4.4 MMOL/L (ref 3.4–5.1)
RBC # BLD: 4.41 M/UL (ref 4.5–6)
SODIUM BLD-SCNC: 138 MMOL/L (ref 136–145)
TOTAL PROTEIN: 6.9 G/DL (ref 6.4–8.3)
TRIGL SERPL-MCNC: 74 MG/DL (ref 0–249)
VLDLC SERPL CALC-MCNC: 15 MG/DL
WBC # BLD: 6.4 K/UL (ref 4–11)

## 2019-05-02 PROCEDURE — 82248 BILIRUBIN DIRECT: CPT

## 2019-05-02 PROCEDURE — 80053 COMPREHEN METABOLIC PANEL: CPT

## 2019-05-02 PROCEDURE — 36415 COLL VENOUS BLD VENIPUNCTURE: CPT

## 2019-05-02 PROCEDURE — 80061 LIPID PANEL: CPT

## 2019-05-02 PROCEDURE — 85027 COMPLETE CBC AUTOMATED: CPT

## 2019-07-24 ENCOUNTER — HOSPITAL ENCOUNTER (OUTPATIENT)
Facility: HOSPITAL | Age: 54
Discharge: HOME | End: 2019-07-24
Payer: COMMERCIAL

## 2019-07-24 LAB
A/G RATIO: 2.6 (ref 0.8–2)
ALBUMIN SERPL-MCNC: 4.7 G/DL (ref 3.4–4.8)
ALP BLD-CCNC: 92 U/L (ref 25–100)
ALT SERPL-CCNC: 27 U/L (ref 4–36)
ANION GAP SERPL CALCULATED.3IONS-SCNC: 15 MMOL/L (ref 3–16)
AST SERPL-CCNC: 20 U/L (ref 8–33)
BASOPHILS ABSOLUTE: 0.1 K/UL (ref 0–0.1)
BASOPHILS RELATIVE PERCENT: 0.8 %
BILIRUB SERPL-MCNC: 0.4 MG/DL (ref 0.3–1.2)
BUN BLDV-MCNC: 10 MG/DL (ref 6–20)
CALCIUM SERPL-MCNC: 9.3 MG/DL (ref 8.5–10.5)
CHLORIDE BLD-SCNC: 102 MMOL/L (ref 98–107)
CO2: 23 MMOL/L (ref 20–30)
CREAT SERPL-MCNC: 0.8 MG/DL (ref 0.4–1.2)
EOSINOPHILS ABSOLUTE: 0.2 K/UL (ref 0–0.4)
EOSINOPHILS RELATIVE PERCENT: 2.8 %
GFR AFRICAN AMERICAN: >59
GFR NON-AFRICAN AMERICAN: >59
GLOBULIN: 1.8 G/DL
GLUCOSE BLD-MCNC: 112 MG/DL (ref 74–106)
HCT VFR BLD CALC: 38.4 % (ref 40–54)
HEMOGLOBIN: 12.9 G/DL (ref 13–18)
IMMATURE GRANULOCYTES #: 0 K/UL
IMMATURE GRANULOCYTES %: 0.4 % (ref 0–5)
LYMPHOCYTES ABSOLUTE: 2 K/UL (ref 1.5–4)
LYMPHOCYTES RELATIVE PERCENT: 25.5 %
MCH RBC QN AUTO: 31.5 PG (ref 27–32)
MCHC RBC AUTO-ENTMCNC: 33.6 G/DL (ref 31–35)
MCV RBC AUTO: 93.7 FL (ref 80–100)
MONOCYTES ABSOLUTE: 0.9 K/UL (ref 0.2–0.8)
MONOCYTES RELATIVE PERCENT: 11 %
NEUTROPHILS ABSOLUTE: 4.7 K/UL (ref 2–7.5)
NEUTROPHILS RELATIVE PERCENT: 59.5 %
PDW BLD-RTO: 13.9 % (ref 11–16)
PLATELET # BLD: 245 K/UL (ref 150–400)
PMV BLD AUTO: 9.9 FL (ref 6–10)
POTASSIUM SERPL-SCNC: 4.1 MMOL/L (ref 3.4–5.1)
RBC # BLD: 4.1 M/UL (ref 4.5–6)
SODIUM BLD-SCNC: 140 MMOL/L (ref 136–145)
TOTAL PROTEIN: 6.5 G/DL (ref 6.4–8.3)
WBC # BLD: 7.9 K/UL (ref 4–11)

## 2019-07-24 PROCEDURE — 36415 COLL VENOUS BLD VENIPUNCTURE: CPT

## 2019-07-24 PROCEDURE — 85025 COMPLETE CBC W/AUTO DIFF WBC: CPT

## 2019-07-24 PROCEDURE — 80053 COMPREHEN METABOLIC PANEL: CPT

## 2019-07-29 ENCOUNTER — HOSPITAL ENCOUNTER (OUTPATIENT)
Facility: HOSPITAL | Age: 54
Discharge: HOME | End: 2019-07-29
Payer: COMMERCIAL

## 2019-07-29 LAB
A/G RATIO: 1.9 (ref 0.8–2)
ALBUMIN SERPL-MCNC: 4.9 G/DL (ref 3.4–4.8)
ALP BLD-CCNC: 99 U/L (ref 25–100)
ALT SERPL-CCNC: 25 U/L (ref 4–36)
ANION GAP SERPL CALCULATED.3IONS-SCNC: 12 MMOL/L (ref 3–16)
APTT: 23.5 SEC (ref 22.2–27.5)
AST SERPL-CCNC: 20 U/L (ref 8–33)
BASOPHILS ABSOLUTE: 0.1 K/UL (ref 0–0.1)
BASOPHILS RELATIVE PERCENT: 0.9 %
BILIRUB SERPL-MCNC: 0.4 MG/DL (ref 0.3–1.2)
BUN BLDV-MCNC: 11 MG/DL (ref 6–20)
CALCIUM SERPL-MCNC: 9.5 MG/DL (ref 8.5–10.5)
CHLORIDE BLD-SCNC: 101 MMOL/L (ref 98–107)
CO2: 24 MMOL/L (ref 20–30)
CREAT SERPL-MCNC: 0.7 MG/DL (ref 0.4–1.2)
EOSINOPHILS ABSOLUTE: 0.2 K/UL (ref 0–0.4)
EOSINOPHILS RELATIVE PERCENT: 2.3 %
GFR AFRICAN AMERICAN: >59
GFR NON-AFRICAN AMERICAN: >59
GLOBULIN: 2.6 G/DL
GLUCOSE BLD-MCNC: 108 MG/DL (ref 74–106)
HCT VFR BLD CALC: 41.8 % (ref 40–54)
HEMOGLOBIN: 14.1 G/DL (ref 13–18)
IMMATURE GRANULOCYTES #: 0.1 K/UL
IMMATURE GRANULOCYTES %: 0.6 % (ref 0–5)
INR BLD: 1 (ref 0.94–1.06)
LYMPHOCYTES ABSOLUTE: 2.1 K/UL (ref 1.5–4)
LYMPHOCYTES RELATIVE PERCENT: 26.7 %
MCH RBC QN AUTO: 31.5 PG (ref 27–32)
MCHC RBC AUTO-ENTMCNC: 33.7 G/DL (ref 31–35)
MCV RBC AUTO: 93.3 FL (ref 80–100)
MONOCYTES ABSOLUTE: 0.8 K/UL (ref 0.2–0.8)
MONOCYTES RELATIVE PERCENT: 10 %
NEUTROPHILS ABSOLUTE: 4.7 K/UL (ref 2–7.5)
NEUTROPHILS RELATIVE PERCENT: 59.5 %
PDW BLD-RTO: 13.7 % (ref 11–16)
PLATELET # BLD: 267 K/UL (ref 150–400)
PMV BLD AUTO: 9.2 FL (ref 6–10)
POTASSIUM SERPL-SCNC: 4.1 MMOL/L (ref 3.4–5.1)
PROTHROMBIN TIME: 9.6 SEC (ref 9.4–10.6)
RBC # BLD: 4.48 M/UL (ref 4.5–6)
SODIUM BLD-SCNC: 137 MMOL/L (ref 136–145)
TOTAL PROTEIN: 7.5 G/DL (ref 6.4–8.3)
WBC # BLD: 7.8 K/UL (ref 4–11)

## 2019-07-29 PROCEDURE — 85610 PROTHROMBIN TIME: CPT

## 2019-07-29 PROCEDURE — 85025 COMPLETE CBC W/AUTO DIFF WBC: CPT

## 2019-07-29 PROCEDURE — 36415 COLL VENOUS BLD VENIPUNCTURE: CPT

## 2019-07-29 PROCEDURE — 85730 THROMBOPLASTIN TIME PARTIAL: CPT

## 2019-07-29 PROCEDURE — 80053 COMPREHEN METABOLIC PANEL: CPT

## 2019-07-29 PROCEDURE — 93005 ELECTROCARDIOGRAM TRACING: CPT

## 2019-09-03 ENCOUNTER — HOSPITAL ENCOUNTER (OUTPATIENT)
Dept: PHYSICAL THERAPY | Facility: HOSPITAL | Age: 54
Setting detail: THERAPIES SERIES
Discharge: HOME | End: 2019-09-03
Payer: COMMERCIAL

## 2019-09-03 PROCEDURE — 97140 MANUAL THERAPY 1/> REGIONS: CPT

## 2019-09-03 PROCEDURE — 97110 THERAPEUTIC EXERCISES: CPT

## 2019-09-03 PROCEDURE — 97161 PT EVAL LOW COMPLEX 20 MIN: CPT

## 2019-09-03 ASSESSMENT — PAIN DESCRIPTION - FREQUENCY: FREQUENCY: CONTINUOUS

## 2019-09-03 ASSESSMENT — PAIN DESCRIPTION - ORIENTATION: ORIENTATION: RIGHT

## 2019-09-03 ASSESSMENT — PAIN DESCRIPTION - LOCATION: LOCATION: SHOULDER

## 2019-09-03 ASSESSMENT — PAIN SCALES - GENERAL: PAINLEVEL_OUTOF10: 3

## 2019-09-03 ASSESSMENT — PAIN DESCRIPTION - ONSET: ONSET: ON-GOING

## 2019-09-03 ASSESSMENT — PAIN DESCRIPTION - PROGRESSION: CLINICAL_PROGRESSION: GRADUALLY IMPROVING

## 2019-09-03 ASSESSMENT — PAIN DESCRIPTION - PAIN TYPE: TYPE: SURGICAL PAIN

## 2019-09-05 ENCOUNTER — HOSPITAL ENCOUNTER (OUTPATIENT)
Dept: PHYSICAL THERAPY | Facility: HOSPITAL | Age: 54
Setting detail: THERAPIES SERIES
Discharge: HOME | End: 2019-09-05
Payer: COMMERCIAL

## 2019-09-05 PROCEDURE — 97110 THERAPEUTIC EXERCISES: CPT

## 2019-09-05 PROCEDURE — 97140 MANUAL THERAPY 1/> REGIONS: CPT

## 2019-09-05 NOTE — FLOWSHEET NOTE
Physical Therapy Daily Treatment Note   Date:  2019    TIme In:   1330                   Time Out:  0    Patient Name:  Eladio Terrazas    :  1965  MRN: [id number]    Restrictions/Precautions:    Pertinent Medical History:  Medical/Treatment Diagnosis Information:  ·   s/p right shoulder ATS with biceps tenodesis; joint pain, shoulder stiffness, muscle weakness  ·    Insurance/Certification information:    [facility]  Physician Information:    Yanet Mcgee MD  Plan of care signed (Y/N):    Visit# / total visits:   2/    G-Code (if applicable):      Date / Visit # G-Code Applied:         Progress Note: []  Yes  [x]  No  Next due by: Visit #10      Pain level:  6 /10    Subjective:  Shoulder felt better after initial visit; still pretty sore. Objective:  Observation:   Test measurements:    Palpation:    Exercises:  Exercise Resistance/Repetitions Other comments   scap squeezes 2 x 15 5   Seated neutral IR, ER 2 x 30 5   Pulleys: scaption 3' x 2 5   Elbow, wrist AROM 30x 5   Supine self AAROM 3 x 10 5   Pendulum 30x cw, ccw 5                                   Other Therapeutic Activities:      Manual Treatments:  Right shoulder grade 1-3 mobs,PROM x 12'    Modalities:   Ice prn for pain x 12'      Timed Code Treatment Minutes:  36'      Total Treatment Minutes:  62'    Treatment/Activity Tolerance:  [x] Patient tolerated treatment well [] Patient limited by fatigue  [] Patient limited by pain  [] Patient limited by other medical complications  [] Other:     Pain after treatment:    0  /10    Prognosis: [] Good [] Fair  [] Poor    Patient Requires Follow-up: [x] Yes  [] No    Plan:   [x] Continue per plan of care [] Alter current plan (see comments)  [] Plan of care initiated [] Hold pending MD visit [] Discharge    Plan for Next Session:        Electronically signed by:  Unique Gordon PT

## 2019-09-09 ENCOUNTER — HOSPITAL ENCOUNTER (OUTPATIENT)
Dept: PHYSICAL THERAPY | Facility: HOSPITAL | Age: 54
Setting detail: THERAPIES SERIES
Discharge: HOME | End: 2019-09-09
Payer: COMMERCIAL

## 2019-09-09 PROCEDURE — 97140 MANUAL THERAPY 1/> REGIONS: CPT

## 2019-09-09 PROCEDURE — 97110 THERAPEUTIC EXERCISES: CPT

## 2019-09-09 NOTE — FLOWSHEET NOTE
Physical Therapy Daily Treatment Note   Date:  2019    TIme In:   826                   Time Out:  908    Patient Name:  Mike Hill    :  1965  MRN: [id number]    Restrictions/Precautions:    Pertinent Medical History:  Medical/Treatment Diagnosis Information:  ·   s/p right shoulder ATS with biceps tenodesis; joint pain, shoulder stiffness, muscle weakness     Insurance/Certification information:    [facility]  Physician Information:    Solange Woods MD  Plan of care signed (Y/N):    Visit# / total visits:   3 /    G-Code (if applicable):      Date / Visit # G-Code Applied:         Progress Note: []  Yes  [x]  No  Next due by: Visit #10      Pain level:  0 /10    Subjective:  Feels pretty good this morning; but had a lot of pain over the weekend - thinks he may have slept on his shoulder. Objective:  Observation:   Test measurements:    Palpation:    Exercises:  Exercise Resistance/Repetitions Other comments   scap squeezes 2 x 15 9   Seated neutral IR, ER 2 x 30 9   Pulleys: scaption 3' x 2 9   Elbow, wrist AROM 30x 9   Supine self AAROM 3 x 10 9   Pendulum 30x cw, ccw 9        Right shoulder isometrics: ext, IR, ER 5\" x 15 * 9                         Other Therapeutic Activities:      Manual Treatments:  Right shoulder grade 1-3 mobs,PROM x 12'    Modalities:   Ice prn for pain x 12' - patient declined      Timed Code Treatment Minutes:  36'      Total Treatment Minutes: 43 '    Treatment/Activity Tolerance:  [x] Patient tolerated treatment well [] Patient limited by fatigue  [] Patient limited by pain  [] Patient limited by other medical complications  [] Other:     Pain after treatment:    4 /10    Prognosis: [x] Good [] Fair  [] Poor    Patient Requires Follow-up: [x] Yes  [] No    Plan:   [x] Continue per plan of care [] Alter current plan (see comments)  [] Plan of care initiated [] Hold pending MD visit [] Discharge    Plan for Next Session:        Electronically signed by:  Fortino Chavez

## 2019-09-11 ENCOUNTER — HOSPITAL ENCOUNTER (OUTPATIENT)
Dept: PHYSICAL THERAPY | Facility: HOSPITAL | Age: 54
Setting detail: THERAPIES SERIES
Discharge: HOME | End: 2019-09-11
Payer: COMMERCIAL

## 2019-09-11 PROCEDURE — 97110 THERAPEUTIC EXERCISES: CPT

## 2019-09-11 PROCEDURE — 97140 MANUAL THERAPY 1/> REGIONS: CPT

## 2019-09-16 ENCOUNTER — HOSPITAL ENCOUNTER (OUTPATIENT)
Dept: PHYSICAL THERAPY | Facility: HOSPITAL | Age: 54
Setting detail: THERAPIES SERIES
Discharge: HOME | End: 2019-09-16
Payer: COMMERCIAL

## 2019-09-16 PROCEDURE — 97140 MANUAL THERAPY 1/> REGIONS: CPT

## 2019-09-16 PROCEDURE — 97110 THERAPEUTIC EXERCISES: CPT

## 2019-09-18 ENCOUNTER — HOSPITAL ENCOUNTER (OUTPATIENT)
Dept: PHYSICAL THERAPY | Facility: HOSPITAL | Age: 54
Setting detail: THERAPIES SERIES
Discharge: HOME | End: 2019-09-18
Payer: COMMERCIAL

## 2019-09-18 PROCEDURE — 97140 MANUAL THERAPY 1/> REGIONS: CPT

## 2019-09-18 PROCEDURE — 97110 THERAPEUTIC EXERCISES: CPT

## 2019-09-23 ENCOUNTER — HOSPITAL ENCOUNTER (OUTPATIENT)
Dept: PHYSICAL THERAPY | Facility: HOSPITAL | Age: 54
Setting detail: THERAPIES SERIES
Discharge: HOME | End: 2019-09-23
Payer: COMMERCIAL

## 2019-09-23 PROCEDURE — 97110 THERAPEUTIC EXERCISES: CPT

## 2019-09-23 PROCEDURE — 97140 MANUAL THERAPY 1/> REGIONS: CPT

## 2019-09-25 ENCOUNTER — APPOINTMENT (OUTPATIENT)
Dept: PHYSICAL THERAPY | Facility: HOSPITAL | Age: 54
End: 2019-09-25
Payer: COMMERCIAL

## 2019-09-26 ENCOUNTER — HOSPITAL ENCOUNTER (OUTPATIENT)
Dept: PHYSICAL THERAPY | Facility: HOSPITAL | Age: 54
Setting detail: THERAPIES SERIES
Discharge: HOME | End: 2019-09-26
Payer: COMMERCIAL

## 2019-09-26 PROCEDURE — 97140 MANUAL THERAPY 1/> REGIONS: CPT

## 2019-09-26 PROCEDURE — 97110 THERAPEUTIC EXERCISES: CPT

## 2019-09-26 NOTE — FLOWSHEET NOTE
Physical Therapy Daily Treatment Note   Date:  2019    TIme In:      829                Time Out:         Patient Name:  Varun Jack    :  1965  MRN: [id number]    Restrictions/Precautions:    Pertinent Medical History:  Medical/Treatment Diagnosis Information:  ·   s/p right shoulder ATS with biceps tenodesis; joint pain, shoulder stiffness, muscle weakness     Insurance/Certification information:    [facility]  Physician Information:    Rudolph Ignacio MD  Plan of care signed (Y/N):    Visit# / total visits:   8 /    G-Code (if applicable):      Date / Visit # G-Code Applied:         Progress Note: []  Yes  [x]  No  Next due by: Visit #10      Pain level: 4 /10    Subjective:  Pt reports: shoulder is sore, but feels he is getting better     Objective:  Observation:   Test measurements:    Palpation:    Exercises:  Exercise Resistance/Repetitions Other comments   scap squeezes 2 x 15 26   Seated neutral IR, ER 2 x 30 26   Pulleys: scaption 3' x 2 26   Elbow, wrist AROM 30x 26   Supine self AAROM 3 x 10 26   Pendulum 30x cw, ccw 26        Right shoulder isometrics: ext, IR, ER 5\" x 15 26        FF with wand 1# - 3 x 15                Other Therapeutic Activities:      Manual Treatments:  Right shoulder grade 1-3 mobs, PROM x 12'    Modalities: Ice prn for pain x 12' - patient declined       Timed Code Treatment Minutes:   36'      Total Treatment Minutes:   52'    Treatment/Activity Tolerance:  [x] Patient tolerated treatment well [] Patient limited by fatigue  [] Patient limited by pain  [] Patient limited by other medical complications  [x] Other:  Pt with decreased pain after todays session and mild discomfort at end range during manual tx.     Pain after treatment:   1-2 /10    Prognosis: [x] Good [] Fair  [] Poor    Patient Requires Follow-up: [x] Yes  [] No    Plan:   [x] Continue per plan of care [] Alter current plan (see comments)  [] Plan of care initiated [] Hold pending MD visit []

## 2019-09-30 ENCOUNTER — HOSPITAL ENCOUNTER (OUTPATIENT)
Dept: PHYSICAL THERAPY | Facility: HOSPITAL | Age: 54
Setting detail: THERAPIES SERIES
Discharge: HOME | End: 2019-09-30
Payer: COMMERCIAL

## 2019-09-30 PROCEDURE — 97110 THERAPEUTIC EXERCISES: CPT

## 2019-09-30 PROCEDURE — 97140 MANUAL THERAPY 1/> REGIONS: CPT

## 2019-10-03 ENCOUNTER — HOSPITAL ENCOUNTER (OUTPATIENT)
Dept: PHYSICAL THERAPY | Facility: HOSPITAL | Age: 54
Setting detail: THERAPIES SERIES
Discharge: HOME | End: 2019-10-03
Payer: COMMERCIAL

## 2019-10-03 PROCEDURE — 97110 THERAPEUTIC EXERCISES: CPT

## 2019-10-03 PROCEDURE — 97140 MANUAL THERAPY 1/> REGIONS: CPT

## 2019-10-07 ENCOUNTER — HOSPITAL ENCOUNTER (OUTPATIENT)
Facility: HOSPITAL | Age: 54
Discharge: HOME | End: 2019-10-07
Payer: COMMERCIAL

## 2019-10-07 ENCOUNTER — HOSPITAL ENCOUNTER (OUTPATIENT)
Dept: GENERAL RADIOLOGY | Facility: HOSPITAL | Age: 54
Discharge: HOME | End: 2019-10-07
Payer: COMMERCIAL

## 2019-10-07 ENCOUNTER — HOSPITAL ENCOUNTER (OUTPATIENT)
Dept: PHYSICAL THERAPY | Facility: HOSPITAL | Age: 54
Setting detail: THERAPIES SERIES
Discharge: HOME | End: 2019-10-07
Payer: COMMERCIAL

## 2019-10-07 DIAGNOSIS — N13.8 ENLARGED PROSTATE WITH URINARY OBSTRUCTION: ICD-10-CM

## 2019-10-07 DIAGNOSIS — N40.1 ENLARGED PROSTATE WITH URINARY OBSTRUCTION: ICD-10-CM

## 2019-10-07 LAB
A/G RATIO: 2 (ref 0.8–2)
ALBUMIN SERPL-MCNC: 4.5 G/DL (ref 3.4–4.8)
ALP BLD-CCNC: 100 U/L (ref 25–100)
ALT SERPL-CCNC: 32 U/L (ref 4–36)
ANION GAP SERPL CALCULATED.3IONS-SCNC: 11 MMOL/L (ref 3–16)
AST SERPL-CCNC: 24 U/L (ref 8–33)
BILIRUB SERPL-MCNC: 0.3 MG/DL (ref 0.3–1.2)
BUN BLDV-MCNC: 12 MG/DL (ref 6–20)
CALCIUM SERPL-MCNC: 9 MG/DL (ref 8.5–10.5)
CHLORIDE BLD-SCNC: 102 MMOL/L (ref 98–107)
CO2: 29 MMOL/L (ref 20–30)
CREAT SERPL-MCNC: 0.8 MG/DL (ref 0.4–1.2)
GFR AFRICAN AMERICAN: >59
GFR NON-AFRICAN AMERICAN: >59
GLOBULIN: 2.2 G/DL
GLUCOSE BLD-MCNC: 105 MG/DL (ref 74–106)
POTASSIUM SERPL-SCNC: 3.8 MMOL/L (ref 3.4–5.1)
PROSTATE SPECIFIC ANTIGEN: 0.15 NG/ML (ref 0–4)
SODIUM BLD-SCNC: 142 MMOL/L (ref 136–145)
TOTAL PROTEIN: 6.7 G/DL (ref 6.4–8.3)

## 2019-10-07 PROCEDURE — 97140 MANUAL THERAPY 1/> REGIONS: CPT

## 2019-10-07 PROCEDURE — 74018 RADEX ABDOMEN 1 VIEW: CPT

## 2019-10-07 PROCEDURE — 36415 COLL VENOUS BLD VENIPUNCTURE: CPT

## 2019-10-07 PROCEDURE — 97110 THERAPEUTIC EXERCISES: CPT

## 2019-10-07 PROCEDURE — 84153 ASSAY OF PSA TOTAL: CPT

## 2019-10-07 PROCEDURE — 80053 COMPREHEN METABOLIC PANEL: CPT

## 2019-10-10 ENCOUNTER — HOSPITAL ENCOUNTER (OUTPATIENT)
Dept: PHYSICAL THERAPY | Facility: HOSPITAL | Age: 54
Setting detail: THERAPIES SERIES
Discharge: HOME | End: 2019-10-10
Payer: COMMERCIAL

## 2019-10-10 PROCEDURE — 97140 MANUAL THERAPY 1/> REGIONS: CPT

## 2019-10-10 PROCEDURE — 97110 THERAPEUTIC EXERCISES: CPT

## 2019-10-15 ENCOUNTER — HOSPITAL ENCOUNTER (OUTPATIENT)
Dept: PHYSICAL THERAPY | Facility: HOSPITAL | Age: 54
Setting detail: THERAPIES SERIES
Discharge: HOME | End: 2019-10-15
Payer: COMMERCIAL

## 2019-10-15 PROCEDURE — 97110 THERAPEUTIC EXERCISES: CPT

## 2019-10-15 PROCEDURE — 97140 MANUAL THERAPY 1/> REGIONS: CPT

## 2019-10-17 ENCOUNTER — HOSPITAL ENCOUNTER (OUTPATIENT)
Dept: PHYSICAL THERAPY | Facility: HOSPITAL | Age: 54
Setting detail: THERAPIES SERIES
Discharge: HOME | End: 2019-10-17
Payer: COMMERCIAL

## 2019-10-17 ENCOUNTER — HOSPITAL ENCOUNTER (OUTPATIENT)
Dept: PHYSICAL THERAPY | Facility: HOSPITAL | Age: 54
Setting detail: THERAPIES SERIES
End: 2019-10-17
Payer: COMMERCIAL

## 2019-10-17 PROCEDURE — 97140 MANUAL THERAPY 1/> REGIONS: CPT

## 2019-10-17 PROCEDURE — 97110 THERAPEUTIC EXERCISES: CPT

## 2019-10-21 ENCOUNTER — HOSPITAL ENCOUNTER (OUTPATIENT)
Dept: PHYSICAL THERAPY | Facility: HOSPITAL | Age: 54
Setting detail: THERAPIES SERIES
Discharge: HOME | End: 2019-10-21
Payer: COMMERCIAL

## 2019-10-21 PROCEDURE — 97140 MANUAL THERAPY 1/> REGIONS: CPT

## 2019-10-21 PROCEDURE — 97110 THERAPEUTIC EXERCISES: CPT

## 2019-10-24 ENCOUNTER — HOSPITAL ENCOUNTER (OUTPATIENT)
Dept: PHYSICAL THERAPY | Facility: HOSPITAL | Age: 54
Setting detail: THERAPIES SERIES
Discharge: HOME | End: 2019-10-24
Payer: COMMERCIAL

## 2019-10-24 PROCEDURE — 97110 THERAPEUTIC EXERCISES: CPT

## 2019-10-24 PROCEDURE — 97140 MANUAL THERAPY 1/> REGIONS: CPT

## 2019-10-29 ENCOUNTER — HOSPITAL ENCOUNTER (OUTPATIENT)
Dept: PHYSICAL THERAPY | Facility: HOSPITAL | Age: 54
Setting detail: THERAPIES SERIES
Discharge: HOME | End: 2019-10-29
Payer: COMMERCIAL

## 2019-10-29 PROCEDURE — 97110 THERAPEUTIC EXERCISES: CPT

## 2019-10-29 PROCEDURE — 97140 MANUAL THERAPY 1/> REGIONS: CPT

## 2019-10-31 ENCOUNTER — HOSPITAL ENCOUNTER (OUTPATIENT)
Dept: PHYSICAL THERAPY | Facility: HOSPITAL | Age: 54
Setting detail: THERAPIES SERIES
Discharge: HOME | End: 2019-10-31
Payer: COMMERCIAL

## 2019-10-31 PROCEDURE — 97110 THERAPEUTIC EXERCISES: CPT

## 2019-10-31 PROCEDURE — 97140 MANUAL THERAPY 1/> REGIONS: CPT

## 2019-11-05 ENCOUNTER — HOSPITAL ENCOUNTER (OUTPATIENT)
Dept: PHYSICAL THERAPY | Facility: HOSPITAL | Age: 54
Setting detail: THERAPIES SERIES
End: 2019-11-05
Payer: COMMERCIAL

## 2019-11-07 ENCOUNTER — APPOINTMENT (OUTPATIENT)
Dept: PHYSICAL THERAPY | Facility: HOSPITAL | Age: 54
End: 2019-11-07
Payer: COMMERCIAL

## 2019-11-12 ENCOUNTER — HOSPITAL ENCOUNTER (OUTPATIENT)
Dept: PHYSICAL THERAPY | Facility: HOSPITAL | Age: 54
Setting detail: THERAPIES SERIES
Discharge: HOME | End: 2019-11-12
Payer: COMMERCIAL

## 2019-11-12 PROCEDURE — 97110 THERAPEUTIC EXERCISES: CPT

## 2019-11-12 PROCEDURE — 97140 MANUAL THERAPY 1/> REGIONS: CPT

## 2019-11-14 ENCOUNTER — HOSPITAL ENCOUNTER (OUTPATIENT)
Dept: PHYSICAL THERAPY | Facility: HOSPITAL | Age: 54
Setting detail: THERAPIES SERIES
Discharge: HOME | End: 2019-11-14
Payer: COMMERCIAL

## 2019-11-14 PROCEDURE — 97140 MANUAL THERAPY 1/> REGIONS: CPT

## 2019-11-14 PROCEDURE — 97110 THERAPEUTIC EXERCISES: CPT

## 2019-11-19 ENCOUNTER — HOSPITAL ENCOUNTER (OUTPATIENT)
Dept: PHYSICAL THERAPY | Facility: HOSPITAL | Age: 54
Setting detail: THERAPIES SERIES
Discharge: HOME | End: 2019-11-19
Payer: COMMERCIAL

## 2019-11-19 PROCEDURE — 97140 MANUAL THERAPY 1/> REGIONS: CPT

## 2019-11-19 PROCEDURE — 97110 THERAPEUTIC EXERCISES: CPT

## 2019-11-21 ENCOUNTER — APPOINTMENT (OUTPATIENT)
Dept: PHYSICAL THERAPY | Facility: HOSPITAL | Age: 54
End: 2019-11-21
Payer: COMMERCIAL

## 2019-11-27 ENCOUNTER — HOSPITAL ENCOUNTER (OUTPATIENT)
Dept: PHYSICAL THERAPY | Facility: HOSPITAL | Age: 54
Setting detail: THERAPIES SERIES
Discharge: HOME | End: 2019-11-27
Payer: COMMERCIAL

## 2019-11-27 PROCEDURE — 97110 THERAPEUTIC EXERCISES: CPT | Performed by: PHYSICAL THERAPY ASSISTANT

## 2019-11-27 PROCEDURE — 97140 MANUAL THERAPY 1/> REGIONS: CPT | Performed by: PHYSICAL THERAPY ASSISTANT

## 2019-12-02 ENCOUNTER — HOSPITAL ENCOUNTER (OUTPATIENT)
Dept: PHYSICAL THERAPY | Facility: HOSPITAL | Age: 54
Setting detail: THERAPIES SERIES
Discharge: HOME | End: 2019-12-02
Payer: COMMERCIAL

## 2019-12-02 PROCEDURE — 97110 THERAPEUTIC EXERCISES: CPT

## 2019-12-02 PROCEDURE — 97140 MANUAL THERAPY 1/> REGIONS: CPT

## 2019-12-04 ENCOUNTER — HOSPITAL ENCOUNTER (OUTPATIENT)
Dept: PHYSICAL THERAPY | Facility: HOSPITAL | Age: 54
Setting detail: THERAPIES SERIES
Discharge: HOME | End: 2019-12-04
Payer: COMMERCIAL

## 2019-12-04 PROCEDURE — 97110 THERAPEUTIC EXERCISES: CPT

## 2019-12-04 PROCEDURE — 97140 MANUAL THERAPY 1/> REGIONS: CPT

## 2019-12-09 ENCOUNTER — HOSPITAL ENCOUNTER (OUTPATIENT)
Dept: PHYSICAL THERAPY | Facility: HOSPITAL | Age: 54
Setting detail: THERAPIES SERIES
Discharge: HOME | End: 2019-12-09
Payer: COMMERCIAL

## 2019-12-09 PROCEDURE — 97110 THERAPEUTIC EXERCISES: CPT

## 2019-12-09 PROCEDURE — 97140 MANUAL THERAPY 1/> REGIONS: CPT

## 2019-12-11 ENCOUNTER — HOSPITAL ENCOUNTER (OUTPATIENT)
Dept: PHYSICAL THERAPY | Facility: HOSPITAL | Age: 54
Setting detail: THERAPIES SERIES
Discharge: HOME | End: 2019-12-11
Payer: COMMERCIAL

## 2019-12-11 PROCEDURE — 97110 THERAPEUTIC EXERCISES: CPT

## 2019-12-11 PROCEDURE — 97140 MANUAL THERAPY 1/> REGIONS: CPT

## 2019-12-16 ENCOUNTER — HOSPITAL ENCOUNTER (OUTPATIENT)
Dept: PHYSICAL THERAPY | Facility: HOSPITAL | Age: 54
Setting detail: THERAPIES SERIES
End: 2019-12-16
Payer: COMMERCIAL

## 2019-12-16 ENCOUNTER — HOSPITAL ENCOUNTER (OUTPATIENT)
Facility: HOSPITAL | Age: 54
Discharge: HOME | End: 2019-12-16
Payer: COMMERCIAL

## 2019-12-16 LAB
A/G RATIO: 2.3 (ref 0.8–2)
ALBUMIN SERPL-MCNC: 4.6 G/DL (ref 3.4–4.8)
ALP BLD-CCNC: 93 U/L (ref 25–100)
ALT SERPL-CCNC: 26 U/L (ref 4–36)
ANION GAP SERPL CALCULATED.3IONS-SCNC: 13 MMOL/L (ref 3–16)
AST SERPL-CCNC: 21 U/L (ref 8–33)
BASOPHILS ABSOLUTE: 0.1 K/UL (ref 0–0.1)
BASOPHILS RELATIVE PERCENT: 0.9 %
BILIRUB SERPL-MCNC: 0.5 MG/DL (ref 0.3–1.2)
BUN BLDV-MCNC: 9 MG/DL (ref 6–20)
CALCIUM SERPL-MCNC: 9.4 MG/DL (ref 8.5–10.5)
CHLORIDE BLD-SCNC: 103 MMOL/L (ref 98–107)
CHOLESTEROL, TOTAL: 119 MG/DL (ref 0–200)
CO2: 25 MMOL/L (ref 20–30)
CREAT SERPL-MCNC: 0.7 MG/DL (ref 0.4–1.2)
EOSINOPHILS ABSOLUTE: 0.2 K/UL (ref 0–0.4)
EOSINOPHILS RELATIVE PERCENT: 1.9 %
FOLATE: 12.38 NG/ML
GFR AFRICAN AMERICAN: >59
GFR NON-AFRICAN AMERICAN: >59
GLOBULIN: 2 G/DL
GLUCOSE BLD-MCNC: 96 MG/DL (ref 74–106)
HCT VFR BLD CALC: 38.2 % (ref 40–54)
HDLC SERPL-MCNC: 43 MG/DL (ref 40–60)
HEMOGLOBIN: 13.1 G/DL (ref 13–18)
IMMATURE GRANULOCYTES #: 0 K/UL
IMMATURE GRANULOCYTES %: 0.5 % (ref 0–5)
LDL CHOLESTEROL CALCULATED: 53 MG/DL
LYMPHOCYTES ABSOLUTE: 2.1 K/UL (ref 1.5–4)
LYMPHOCYTES RELATIVE PERCENT: 25.9 %
MCH RBC QN AUTO: 32.5 PG (ref 27–32)
MCHC RBC AUTO-ENTMCNC: 34.3 G/DL (ref 31–35)
MCV RBC AUTO: 94.8 FL (ref 80–100)
MONOCYTES ABSOLUTE: 0.8 K/UL (ref 0.2–0.8)
MONOCYTES RELATIVE PERCENT: 9.9 %
NEUTROPHILS ABSOLUTE: 4.8 K/UL (ref 2–7.5)
NEUTROPHILS RELATIVE PERCENT: 60.9 %
PDW BLD-RTO: 14 % (ref 11–16)
PLATELET # BLD: 267 K/UL (ref 150–400)
PMV BLD AUTO: 9.8 FL (ref 6–10)
POTASSIUM SERPL-SCNC: 4.1 MMOL/L (ref 3.4–5.1)
RBC # BLD: 4.03 M/UL (ref 4.5–6)
SODIUM BLD-SCNC: 141 MMOL/L (ref 136–145)
TOTAL PROTEIN: 6.6 G/DL (ref 6.4–8.3)
TRIGL SERPL-MCNC: 117 MG/DL (ref 0–249)
TSH SERPL DL<=0.05 MIU/L-ACNC: 2.95 UIU/ML (ref 0.35–5.5)
VITAMIN B-12: 496 PG/ML (ref 211–911)
VLDLC SERPL CALC-MCNC: 23 MG/DL
WBC # BLD: 7.9 K/UL (ref 4–11)

## 2019-12-16 PROCEDURE — 85025 COMPLETE CBC W/AUTO DIFF WBC: CPT

## 2019-12-16 PROCEDURE — 36415 COLL VENOUS BLD VENIPUNCTURE: CPT

## 2019-12-16 PROCEDURE — 82607 VITAMIN B-12: CPT

## 2019-12-16 PROCEDURE — 84443 ASSAY THYROID STIM HORMONE: CPT

## 2019-12-16 PROCEDURE — 80053 COMPREHEN METABOLIC PANEL: CPT

## 2019-12-16 PROCEDURE — 82746 ASSAY OF FOLIC ACID SERUM: CPT

## 2019-12-16 PROCEDURE — 80061 LIPID PANEL: CPT

## 2019-12-18 ENCOUNTER — HOSPITAL ENCOUNTER (OUTPATIENT)
Dept: PHYSICAL THERAPY | Facility: HOSPITAL | Age: 54
Setting detail: THERAPIES SERIES
Discharge: HOME | End: 2019-12-18
Payer: COMMERCIAL

## 2019-12-18 PROCEDURE — 97140 MANUAL THERAPY 1/> REGIONS: CPT

## 2019-12-18 PROCEDURE — 97110 THERAPEUTIC EXERCISES: CPT

## 2019-12-30 ENCOUNTER — HOSPITAL ENCOUNTER (OUTPATIENT)
Dept: PHYSICAL THERAPY | Facility: HOSPITAL | Age: 54
Setting detail: THERAPIES SERIES
Discharge: HOME | End: 2019-12-30
Payer: COMMERCIAL

## 2019-12-30 PROCEDURE — 97140 MANUAL THERAPY 1/> REGIONS: CPT

## 2019-12-30 PROCEDURE — 97110 THERAPEUTIC EXERCISES: CPT

## 2020-02-07 ENCOUNTER — HOSPITAL ENCOUNTER (OUTPATIENT)
Facility: HOSPITAL | Age: 55
Discharge: HOME | End: 2020-02-07
Payer: COMMERCIAL

## 2020-02-07 LAB
A/G RATIO: 2.1 (ref 0.8–2)
ALBUMIN SERPL-MCNC: 4.6 G/DL (ref 3.4–4.8)
ALP BLD-CCNC: 90 U/L (ref 25–100)
ALT SERPL-CCNC: 53 U/L (ref 4–36)
ANION GAP SERPL CALCULATED.3IONS-SCNC: 11 MMOL/L (ref 3–16)
AST SERPL-CCNC: 31 U/L (ref 8–33)
BASOPHILS ABSOLUTE: 0 K/UL (ref 0–0.1)
BASOPHILS RELATIVE PERCENT: 0.4 %
BILIRUB SERPL-MCNC: 0.5 MG/DL (ref 0.3–1.2)
BUN BLDV-MCNC: 10 MG/DL (ref 6–20)
CALCIUM SERPL-MCNC: 9.4 MG/DL (ref 8.5–10.5)
CHLORIDE BLD-SCNC: 101 MMOL/L (ref 98–107)
CHOLESTEROL, TOTAL: 130 MG/DL (ref 0–200)
CO2: 26 MMOL/L (ref 20–30)
CREAT SERPL-MCNC: 0.7 MG/DL (ref 0.4–1.2)
EOSINOPHILS ABSOLUTE: 0.2 K/UL (ref 0–0.4)
EOSINOPHILS RELATIVE PERCENT: 2.1 %
GFR AFRICAN AMERICAN: >59
GFR NON-AFRICAN AMERICAN: >59
GLOBULIN: 2.2 G/DL
GLUCOSE BLD-MCNC: 97 MG/DL (ref 74–106)
HCT VFR BLD CALC: 40.4 % (ref 40–54)
HDLC SERPL-MCNC: 39 MG/DL (ref 40–60)
HEMOGLOBIN: 13.5 G/DL (ref 13–18)
IMMATURE GRANULOCYTES #: 0 K/UL
IMMATURE GRANULOCYTES %: 0.4 % (ref 0–5)
LDL CHOLESTEROL CALCULATED: 69 MG/DL
LYMPHOCYTES ABSOLUTE: 2.7 K/UL (ref 1.5–4)
LYMPHOCYTES RELATIVE PERCENT: 31.8 %
MCH RBC QN AUTO: 31.5 PG (ref 27–32)
MCHC RBC AUTO-ENTMCNC: 33.4 G/DL (ref 31–35)
MCV RBC AUTO: 94.4 FL (ref 80–100)
MONOCYTES ABSOLUTE: 0.7 K/UL (ref 0.2–0.8)
MONOCYTES RELATIVE PERCENT: 8.1 %
NEUTROPHILS ABSOLUTE: 4.9 K/UL (ref 2–7.5)
NEUTROPHILS RELATIVE PERCENT: 57.2 %
PDW BLD-RTO: 13.2 % (ref 11–16)
PLATELET # BLD: 256 K/UL (ref 150–400)
PMV BLD AUTO: 9.5 FL (ref 6–10)
POTASSIUM SERPL-SCNC: 4.1 MMOL/L (ref 3.4–5.1)
RBC # BLD: 4.28 M/UL (ref 4.5–6)
SODIUM BLD-SCNC: 138 MMOL/L (ref 136–145)
TOTAL PROTEIN: 6.8 G/DL (ref 6.4–8.3)
TRIGL SERPL-MCNC: 110 MG/DL (ref 0–249)
VLDLC SERPL CALC-MCNC: 22 MG/DL
WBC # BLD: 8.5 K/UL (ref 4–11)

## 2020-02-07 PROCEDURE — 80053 COMPREHEN METABOLIC PANEL: CPT

## 2020-02-07 PROCEDURE — 85025 COMPLETE CBC W/AUTO DIFF WBC: CPT

## 2020-02-07 PROCEDURE — 36415 COLL VENOUS BLD VENIPUNCTURE: CPT

## 2020-02-07 PROCEDURE — 80061 LIPID PANEL: CPT

## 2020-05-01 ENCOUNTER — HOSPITAL ENCOUNTER (OUTPATIENT)
Dept: GENERAL RADIOLOGY | Facility: HOSPITAL | Age: 55
Discharge: HOME | End: 2020-05-01
Payer: COMMERCIAL

## 2020-05-01 ENCOUNTER — HOSPITAL ENCOUNTER (OUTPATIENT)
Facility: HOSPITAL | Age: 55
Discharge: HOME | End: 2020-05-01
Payer: COMMERCIAL

## 2020-05-01 LAB
A/G RATIO: 2.4 (ref 0.8–2)
ALBUMIN SERPL-MCNC: 4.6 G/DL (ref 3.4–4.8)
ALP BLD-CCNC: 77 U/L (ref 25–100)
ALT SERPL-CCNC: 30 U/L (ref 4–36)
ANION GAP SERPL CALCULATED.3IONS-SCNC: 12 MMOL/L (ref 3–16)
AST SERPL-CCNC: 19 U/L (ref 8–33)
BILIRUB SERPL-MCNC: 0.6 MG/DL (ref 0.3–1.2)
BUN BLDV-MCNC: 15 MG/DL (ref 6–20)
CALCIUM SERPL-MCNC: 9.6 MG/DL (ref 8.5–10.5)
CHLORIDE BLD-SCNC: 102 MMOL/L (ref 98–107)
CO2: 26 MMOL/L (ref 20–30)
CREAT SERPL-MCNC: 0.8 MG/DL (ref 0.4–1.2)
GFR AFRICAN AMERICAN: >59
GFR NON-AFRICAN AMERICAN: >59
GLOBULIN: 1.9 G/DL
GLUCOSE BLD-MCNC: 115 MG/DL (ref 74–106)
POTASSIUM SERPL-SCNC: 4 MMOL/L (ref 3.4–5.1)
PROSTATE SPECIFIC ANTIGEN: 0.19 NG/ML (ref 0–4)
SODIUM BLD-SCNC: 140 MMOL/L (ref 136–145)
TOTAL PROTEIN: 6.5 G/DL (ref 6.4–8.3)

## 2020-05-01 PROCEDURE — 36415 COLL VENOUS BLD VENIPUNCTURE: CPT

## 2020-05-01 PROCEDURE — 74018 RADEX ABDOMEN 1 VIEW: CPT

## 2020-05-01 PROCEDURE — 84153 ASSAY OF PSA TOTAL: CPT

## 2020-05-01 PROCEDURE — 80053 COMPREHEN METABOLIC PANEL: CPT

## 2020-05-19 ENCOUNTER — HOSPITAL ENCOUNTER (EMERGENCY)
Facility: HOSPITAL | Age: 55
Discharge: HOME | End: 2020-05-19
Attending: EMERGENCY MEDICINE
Payer: COMMERCIAL

## 2020-05-19 ENCOUNTER — APPOINTMENT (OUTPATIENT)
Dept: GENERAL RADIOLOGY | Facility: HOSPITAL | Age: 55
End: 2020-05-19
Payer: COMMERCIAL

## 2020-05-19 VITALS
DIASTOLIC BLOOD PRESSURE: 68 MMHG | HEART RATE: 95 BPM | BODY MASS INDEX: 29.73 KG/M2 | WEIGHT: 185 LBS | OXYGEN SATURATION: 97 % | SYSTOLIC BLOOD PRESSURE: 114 MMHG | HEIGHT: 66 IN | TEMPERATURE: 99.6 F | RESPIRATION RATE: 18 BRPM

## 2020-05-19 LAB
RAPID INFLUENZA  B AGN: NEGATIVE
RAPID INFLUENZA A AGN: NEGATIVE
S PYO AG THROAT QL: NEGATIVE

## 2020-05-19 PROCEDURE — 6370000000 HC RX 637 (ALT 250 FOR IP): Performed by: EMERGENCY MEDICINE

## 2020-05-19 PROCEDURE — 71046 X-RAY EXAM CHEST 2 VIEWS: CPT

## 2020-05-19 PROCEDURE — 87880 STREP A ASSAY W/OPTIC: CPT

## 2020-05-19 PROCEDURE — 87804 INFLUENZA ASSAY W/OPTIC: CPT

## 2020-05-19 PROCEDURE — 99283 EMERGENCY DEPT VISIT LOW MDM: CPT

## 2020-05-19 RX ORDER — IBUPROFEN 400 MG/1
400 TABLET ORAL ONCE
Status: COMPLETED | OUTPATIENT
Start: 2020-05-19 | End: 2020-05-19

## 2020-05-19 RX ADMIN — IBUPROFEN 400 MG: 400 TABLET ORAL at 16:51

## 2020-05-19 ASSESSMENT — PAIN SCALES - GENERAL
PAINLEVEL_OUTOF10: 8
PAINLEVEL_OUTOF10: 8

## 2020-05-19 ASSESSMENT — PAIN DESCRIPTION - DESCRIPTORS: DESCRIPTORS: ACHING

## 2020-05-19 ASSESSMENT — PAIN DESCRIPTION - PAIN TYPE: TYPE: ACUTE PAIN

## 2020-05-19 NOTE — ED PROVIDER NOTES
Hypertension          SURGICAL HISTORY       Past Surgical History:   Procedure Laterality Date    BLADDER TUMOR EXCISION      CARDIAC SURGERY           CURRENT MEDICATIONS       Previous Medications    ATORVASTATIN (LIPITOR) 20 MG TABLET    Take 80 mg by mouth daily       ALLERGIES     Patient has no known allergies. FAMILY HISTORY     No family history on file.        SOCIAL HISTORY       Social History     Socioeconomic History    Marital status: Single     Spouse name: Not on file    Number of children: Not on file    Years of education: Not on file    Highest education level: Not on file   Occupational History    Not on file   Social Needs    Financial resource strain: Not on file    Food insecurity     Worry: Not on file     Inability: Not on file    Transportation needs     Medical: Not on file     Non-medical: Not on file   Tobacco Use    Smoking status: Former Smoker    Smokeless tobacco: Never Used   Substance and Sexual Activity    Alcohol use: Not Currently    Drug use: Never    Sexual activity: Not on file   Lifestyle    Physical activity     Days per week: Not on file     Minutes per session: Not on file    Stress: Not on file   Relationships    Social connections     Talks on phone: Not on file     Gets together: Not on file     Attends [employer] service: Not on file     Active member of club or organization: Not on file     Attends meetings of clubs or organizations: Not on file     Relationship status: Not on file    Intimate partner violence     Fear of current or ex partner: Not on file     Emotionally abused: Not on file     Physically abused: Not on file     Forced sexual activity: Not on file   Other Topics Concern    Not on file   Social History Narrative    Not on file         PHYSICAL EXAM    (up to 7 for level 4, 8 or more for level 5)     ED Triage Vitals [05/19/20 1629]   BP Temp Temp Source Pulse Resp SpO2 Height Weight   (!) 140/87 99.6 °F (37.6 °C) Oral 106 18 97 % 5' 6\" (1.676 m) 185 lb (83.9 kg)       Physical Exam  General :Patient is awake, alert, oriented, in no acute distress, nontoxic appearing  HEENT: Pupils are equally round and reactive to light, EOMI, conjunctivae clear. Oral mucosa is moist, no exudate. Uvula is midline  Neck: Neck is supple, full range of motion, trachea midline  Cardiac: Heart regular rate, rhythm, no murmurs, rubs, or gallops  Lungs: Lungs are clear to auscultation, there is no wheezing, rhonchi, or rales. There is no use of accessory muscles. Chest wall: There is no tenderness to palpation over the chest wall or over ribs  Abdomen: Abdomen is soft, nontender, nondistended. There is no firm or pulsatile masses, no rebound rigidity or guarding. Musculoskeletal: 5 out of 5 strength in all 4 extremities. No focal muscle deficits are appreciated  Neuro: Motor intact, sensory intact, level of consciousness is normal, cerebellar function is normal, reflexes are grossly normal. No evidence of incontinence or loss of bowel or bladder function, no saddle anesthesia noted   Dermatology: Skin is warm and dry  Psych: Mentation is grossly normal, cognition is grossly normal. Affect is appropriate. DIAGNOSTIC RESULTS     EKG: All EKG's are interpreted by the Emergency Department Physician who either signs or Co-signs this chart in the [address] a cardiologist.    The EKG interpreted by me shows     RADIOLOGY:   Non-plain film images such as CT, Ultrasound and MRI are read by the radiologist. Plain radiographic images are visualized and preliminarily interpreted by the emergency physician with the below findings:      ? Radiologist's Report Reviewed:  XR CHEST STANDARD (2 VW)   Final Result      No lobar consolidation.             ED BEDSIDE ULTRASOUND:   Performed by ED Physician - none    LABS:    I have reviewed and interpreted all of the currently available lab results from this visit (ifapplicable):  Results for orders placed or performed DISPOSITION Decision To Discharge 05/19/2020 05:15:21 PM      PATIENT REFERRED TO:  DOMINIQUE Quezada CNP  [address] [place] [id number]  [phone]    Schedule an appointment as soon as possible for a visit in 2 days      [facility]  [facility] [address] [facility]  [phone]  In 2 days  If symptoms worsen      DISCHARGE MEDICATIONS:  New Prescriptions    No medications on file       Comment: Please note this report has been produced using speech recognition software and may contain errorsrelated to that system including errors in grammar, punctuation, and spelling, as well as words and phrases that may be inappropriate. If there are any questions or concerns please feel free to contact the dictating providerfor clarification.     Nadeem Holt MD  Attending Emergency Physician              Nadeem oHlt MD  05/19/20 Eboni Noel MD  05/19/20 2276

## 2020-05-19 NOTE — ED NOTES
Rn went over d/c instructions with pt. Advised him to stay hydrated as best he can. Can alternate tylenol and ibuprofen every couple hours for fever/discomfort/pain. Advised not to take more than 4000mg tylenol a day. Advised with viral syndromes, care is often symptomatic. Advised can follow up with PCP in 1-2 days. States he is starting to sweat now. Advised he did get a dose of ibuprofen while here and if he was getting a fever prior, it may be responding to the ibuprofen and that may be why he's sweating. Pt ambulated out with no issues. No further questions or concerns.       Karen Aviles RN  05/19/20 [id number]

## 2020-06-01 ENCOUNTER — HOSPITAL ENCOUNTER (EMERGENCY)
Facility: HOSPITAL | Age: 55
Discharge: HOME | End: 2020-06-01
Attending: EMERGENCY MEDICINE | Admitting: EMERGENCY MEDICINE

## 2020-06-01 ENCOUNTER — APPOINTMENT (OUTPATIENT)
Dept: GENERAL RADIOLOGY | Facility: HOSPITAL | Age: 55
End: 2020-06-01

## 2020-06-01 VITALS
DIASTOLIC BLOOD PRESSURE: 75 MMHG | HEIGHT: 66 IN | TEMPERATURE: 100.2 F | HEART RATE: 76 BPM | BODY MASS INDEX: 29.73 KG/M2 | SYSTOLIC BLOOD PRESSURE: 127 MMHG | WEIGHT: 185 LBS | RESPIRATION RATE: 18 BRPM | OXYGEN SATURATION: 96 %

## 2020-06-01 DIAGNOSIS — R50.9 FEVER, UNSPECIFIED FEVER CAUSE: Primary | ICD-10-CM

## 2020-06-01 LAB
ALBUMIN SERPL-MCNC: 4.2 G/DL (ref 3.5–5.2)
ALBUMIN/GLOB SERPL: 1.4 G/DL
ALP SERPL-CCNC: 101 U/L (ref 39–117)
ALT SERPL W P-5'-P-CCNC: 55 U/L (ref 1–41)
ANION GAP SERPL CALCULATED.3IONS-SCNC: 14.2 MMOL/L (ref 5–15)
AST SERPL-CCNC: 30 U/L (ref 1–40)
BACTERIA UR QL AUTO: ABNORMAL /HPF
BASOPHILS # BLD MANUAL: 0.07 10*3/MM3 (ref 0–0.2)
BASOPHILS NFR BLD AUTO: 1 % (ref 0–1.5)
BILIRUB SERPL-MCNC: 0.5 MG/DL (ref 0.2–1.2)
BILIRUB UR QL STRIP: NEGATIVE
BUN BLD-MCNC: 10 MG/DL (ref 6–20)
BUN/CREAT SERPL: 14.1 (ref 7–25)
CALCIUM SPEC-SCNC: 9.3 MG/DL (ref 8.6–10.5)
CHLORIDE SERPL-SCNC: 100 MMOL/L (ref 98–107)
CLARITY UR: CLEAR
CO2 SERPL-SCNC: 23.8 MMOL/L (ref 22–29)
COLOR UR: YELLOW
CREAT BLD-MCNC: 0.71 MG/DL (ref 0.76–1.27)
DEPRECATED RDW RBC AUTO: 46.1 FL (ref 37–54)
ERYTHROCYTE [DISTWIDTH] IN BLOOD BY AUTOMATED COUNT: 13.7 % (ref 12.3–15.4)
GFR SERPL CREATININE-BSD FRML MDRD: 115 ML/MIN/1.73
GLOBULIN UR ELPH-MCNC: 2.9 GM/DL
GLUCOSE BLD-MCNC: 105 MG/DL (ref 65–99)
GLUCOSE UR STRIP-MCNC: NEGATIVE MG/DL
HCT VFR BLD AUTO: 39 % (ref 37.5–51)
HETEROPH AB SER QL LA: POSITIVE
HGB BLD-MCNC: 13.4 G/DL (ref 13–17.7)
HGB UR QL STRIP.AUTO: NEGATIVE
HYALINE CASTS UR QL AUTO: ABNORMAL /LPF
KETONES UR QL STRIP: NEGATIVE
LEUKOCYTE ESTERASE UR QL STRIP.AUTO: NEGATIVE
LYMPHOCYTES # BLD MANUAL: 1.7 10*3/MM3 (ref 0.7–3.1)
LYMPHOCYTES NFR BLD MANUAL: 1 % (ref 5–12)
LYMPHOCYTES NFR BLD MANUAL: 25 % (ref 19.6–45.3)
MCH RBC QN AUTO: 31.2 PG (ref 26.6–33)
MCHC RBC AUTO-ENTMCNC: 34.4 G/DL (ref 31.5–35.7)
MCV RBC AUTO: 90.9 FL (ref 79–97)
MONOCYTES # BLD AUTO: 0.07 10*3/MM3 (ref 0.1–0.9)
NEUTROPHILS # BLD AUTO: 3.39 10*3/MM3 (ref 1.7–7)
NEUTROPHILS NFR BLD MANUAL: 49 % (ref 42.7–76)
NEUTS BAND NFR BLD MANUAL: 1 % (ref 0–5)
NITRITE UR QL STRIP: NEGATIVE
PH UR STRIP.AUTO: 6 [PH] (ref 5–8)
PLAT MORPH BLD: NORMAL
PLATELET # BLD AUTO: 243 10*3/MM3 (ref 140–450)
PMV BLD AUTO: 9 FL (ref 6–12)
POTASSIUM BLD-SCNC: 4 MMOL/L (ref 3.5–5.2)
PROT SERPL-MCNC: 7.1 G/DL (ref 6–8.5)
PROT UR QL STRIP: NEGATIVE
RBC # BLD AUTO: 4.29 10*6/MM3 (ref 4.14–5.8)
RBC # UR: ABNORMAL /HPF
RBC MORPH BLD: NORMAL
REF LAB TEST METHOD: ABNORMAL
SCAN SLIDE: NORMAL
SODIUM BLD-SCNC: 138 MMOL/L (ref 136–145)
SP GR UR STRIP: <=1.005 (ref 1–1.03)
SQUAMOUS #/AREA URNS HPF: ABNORMAL /HPF
UROBILINOGEN UR QL STRIP: NORMAL
VARIANT LYMPHS NFR BLD MANUAL: 23 % (ref 0–5)
WBC MORPH BLD: NORMAL
WBC NRBC COR # BLD: 6.78 10*3/MM3 (ref 3.4–10.8)
WBC UR QL AUTO: ABNORMAL /HPF

## 2020-06-01 PROCEDURE — 85025 COMPLETE CBC W/AUTO DIFF WBC: CPT | Performed by: EMERGENCY MEDICINE

## 2020-06-01 PROCEDURE — 81001 URINALYSIS AUTO W/SCOPE: CPT | Performed by: EMERGENCY MEDICINE

## 2020-06-01 PROCEDURE — 71045 X-RAY EXAM CHEST 1 VIEW: CPT

## 2020-06-01 PROCEDURE — 85007 BL SMEAR W/DIFF WBC COUNT: CPT | Performed by: EMERGENCY MEDICINE

## 2020-06-01 PROCEDURE — 80053 COMPREHEN METABOLIC PANEL: CPT | Performed by: EMERGENCY MEDICINE

## 2020-06-01 PROCEDURE — 86308 HETEROPHILE ANTIBODY SCREEN: CPT | Performed by: EMERGENCY MEDICINE

## 2020-06-01 PROCEDURE — 87040 BLOOD CULTURE FOR BACTERIA: CPT | Performed by: EMERGENCY MEDICINE

## 2020-06-01 PROCEDURE — 87635 SARS-COV-2 COVID-19 AMP PRB: CPT | Performed by: EMERGENCY MEDICINE

## 2020-06-01 PROCEDURE — 99284 EMERGENCY DEPT VISIT MOD MDM: CPT

## 2020-06-01 RX ORDER — IBUPROFEN 800 MG/1
800 TABLET ORAL ONCE
Status: COMPLETED | OUTPATIENT
Start: 2020-06-01 | End: 2020-06-01

## 2020-06-01 RX ORDER — DOXYCYCLINE 100 MG/1
100 CAPSULE ORAL ONCE
Status: COMPLETED | OUTPATIENT
Start: 2020-06-01 | End: 2020-06-01

## 2020-06-01 RX ORDER — DOXYCYCLINE 100 MG/1
100 CAPSULE ORAL 2 TIMES DAILY
Qty: 20 CAPSULE | Refills: 0 | Status: SHIPPED | OUTPATIENT
Start: 2020-06-01

## 2020-06-01 RX ADMIN — DOXYCYCLINE 100 MG: 100 CAPSULE ORAL at 23:44

## 2020-06-01 RX ADMIN — IBUPROFEN 800 MG: 800 TABLET ORAL at 22:22

## 2020-06-02 ENCOUNTER — EPISODE CHANGES (OUTPATIENT)
Dept: CASE MANAGEMENT | Facility: OTHER | Age: 55
End: 2020-06-02

## 2020-06-02 ENCOUNTER — TELEPHONE (OUTPATIENT)
Dept: INFECTIOUS DISEASES | Facility: HOSPITAL | Age: 55
End: 2020-06-02

## 2020-06-02 LAB — SARS-COV-2 RNA RESP QL NAA+PROBE: NOT DETECTED

## 2020-06-02 NOTE — DISCHARGE INSTRUCTIONS
WE tested you for covid-19. Reuslts may take 2 days. We also did blood cultures which may take 3-5 days. Continue motrin  / tylenol for fever. We added doxycycline for your reported history of tick bite. Your lab results suggest you could maybe have mono as well. Drink plenty of fulids, to keep your urine clear, as you will need more fluids with your fever. Follow up with your family doctor within a week, or sooner as needed.

## 2020-06-03 ENCOUNTER — EPISODE CHANGES (OUTPATIENT)
Dept: CASE MANAGEMENT | Facility: OTHER | Age: 55
End: 2020-06-03

## 2020-06-03 ENCOUNTER — TELEPHONE (OUTPATIENT)
Dept: INFECTIOUS DISEASES | Facility: HOSPITAL | Age: 55
End: 2020-06-03

## 2020-06-03 NOTE — ED PROVIDER NOTES
Subjective   History of Present Illness    Chief Complaint: Fever chills body aches  History of Present Illness: 54-year-old male who has had an ongoing issue for the past 2-1/2 weeks above complaint.  2 rounds of Z-Ion.  Had some initial improvement but is having recurring issues which were similar to his initial presentation.  Covid 19 testing was -2 weeks ago.  Onset: Initial onset 2 half weeks ago, recurrent episodes 2 days ago  Duration: Persist  Exacerbating / Alleviating factors: None uses 2 Z-Paks without relief  Associated symptoms: Patient did admit to having tick bites x3 3 weeks ago.  Denies any rash      Nurses Notes reviewed and agree, including vitals, allergies, social history and prior medical history.     REVIEW OF SYSTEMS: All systems reviewed and not pertinent unless noted.    Positive for: Fever body aches and chills, recent tick bites    Negative for: Shortness of breath sick contacts travel hemoptysis syncope abdominal pain flank pain urinary symptoms neck stiffness confusion   Review of Systems    Past Medical History:   Diagnosis Date   • Arthritis    • CAD (coronary artery disease)    • Cancer (CMS/HCC)    • Hyperlipidemia    • Hypertension    • Myocardial infarction (CMS/HCC)        No Known Allergies    Past Surgical History:   Procedure Laterality Date   • BLADDER TUMOR EXCISION     • CARDIAC SURGERY     • CORONARY ANGIOPLASTY WITH STENT PLACEMENT     • SHOULDER SURGERY Right        Family History   Problem Relation Age of Onset   • No Known Problems Mother    • No Known Problems Father        Social History     Socioeconomic History   • Marital status:      Spouse name: Not on file   • Number of children: Not on file   • Years of education: Not on file   • Highest education level: Not on file   Tobacco Use   • Smoking status: Former Smoker   • Smokeless tobacco: Never Used   Substance and Sexual Activity   • Alcohol use: No   • Drug use: No   • Sexual activity: Defer            Objective   Physical Exam      GENERAL APPEARANCE: Well developed, well nourished, in no acute distress.  VITAL SIGNS: per nursing, reviewed and noted  SKIN: no rashes, ulcerations or petechiae.  Head: Normocephalic, atraumatic.   EYES: perrla. EOMI.  ENT:  TM clear, posterior pharynx patent.  LUNGS:  normal breath sounds. No retractions.   CARDIOVASCULAR:  regular rate and rhythm, no murmurs.  Good Peripheral pulses.  ABDOMEN: Soft, nontender, normal bowel sounds. No hernia. No ascites.  MUSCULOSKELETAL:  No tenderness. Full ROM. Strength and tone normal.  NEUROLOGIC: Alert, oriented x 3. No gross deficits.   NECK: Supple, symmetric. No tenderness, no masses. Full ROM  Back: full rom, no paraspinal spasm. No CVA tenderness.   PSYCH: appropriate affect,   : no bladder tenderness or distention, no CVA tenderness      Procedures     To her procedure      ED Course  ED Course as of Jun 03 0017 Mon Jun 01, 2020   2308 Monospot(!): Positive [PF]   Wed Jun 03, 2020 0016 Blood Culture: No growth at 24 hours [PF]   0016 Blood Culture: No growth at 24 hours [PF]   0016 COVID19: Not Detected [PF]   0016 Monospot(!): Positive [PF]   0016 Glucose(!): 105 [PF]   0016 BUN: 10 [PF]   0016 Creatinine(!): 0.71 [PF]   0016 Sodium: 138 [PF]   0016 Potassium: 4.0 [PF]   0016 Chloride: 100 [PF]   0016 CO2: 23.8 [PF]   0016 Calcium: 9.3 [PF]   0016 Total Protein: 7.1 [PF]   0017 Albumin: 4.20 [PF]   0017 ALT (SGPT)(!): 55 [PF]   0017 AST (SGOT): 30 [PF]   0017 Alkaline Phosphatase: 101 [PF]   0017 Monocyte Rel %(!): 1.0 [PF]   0017 Atypical Lymphocyte %(!): 23.0 [PF]   0017 WBC, UA(!): 0-2 [PF]   0017 Bacteria, UA: None Seen [PF]   0017 Squamous Epithelial Cells, UA: None Seen [PF]   0017 Hyaline Casts, UA: None Seen [PF]   0017 Methodology:: Manual Light Microscopy [PF]   0017 WBC: 6.78 [PF]   0017 Hemoglobin: 13.4 [PF]   0017 Hematocrit: 39.0 [PF]   0017 RDW: 13.7 [PF]   0017 Platelets: 243 [PF]      ED Course User  Index  [PF] Buzz Jones, DO          54-year-old male with had 2-1/2 weeks of intermittent fever body aches and chills with COVID-19 testing 2 weeks prior.  Work-up here reveals normal white cell count.  Some atypical lymphocytes mild elevation of the ALT positive Monospot.  Potential mononucleosis is in the differential.  However patient did report tick bite and states that one was in place for he suspects at least 3days to him between his toes.  I will therefore cover patient with doxycycline.  Advised outpatient follow-up with his family doctor for serology testing and reassessment of response to treatment.  Return precautions were discussed.                                  MDM    Final diagnoses:   Fever, unspecified fever cause            Robert Buzz NATALIYA,   06/03/20 0021

## 2020-06-05 ENCOUNTER — EPISODE CHANGES (OUTPATIENT)
Dept: CASE MANAGEMENT | Facility: OTHER | Age: 55
End: 2020-06-05

## 2020-06-06 LAB
BACTERIA SPEC AEROBE CULT: NORMAL
BACTERIA SPEC AEROBE CULT: NORMAL

## 2020-06-08 ENCOUNTER — TELEPHONE (OUTPATIENT)
Dept: INFECTIOUS DISEASES | Facility: HOSPITAL | Age: 55
End: 2020-06-08

## 2020-06-10 ENCOUNTER — PATIENT OUTREACH (OUTPATIENT)
Dept: CASE MANAGEMENT | Facility: OTHER | Age: 55
End: 2020-06-10

## 2020-06-10 NOTE — OUTREACH NOTE
ED Potential Covid Discharge Follow-up    Called patient in follow up to ED visit 6-1-20 with fever.  Patient was tested for Covid-19 virus; stated he was notified of Negative results.  Patient reports: he is doing good; denies fever; is still taking the antibiotic given in the ED.  RN-ACM advised to complete all of the antibiotic.  Patient said he is eating and drinking plenty of water every day.  Patient lives with wife; is independent with all daily care needs.  Reports no barriers to getting prescriptions filled and taking medications as ordered.  Reports no food or transportation insecurities.    Reviewed with patient: Quarantine precautions (voiced understanding and compliance); ED discharge recommendations, AVS from ED; 24/7 Nurse Triage Line, [phone]; Covid-19 Hotline#, [phone].  Discussed importance of close PCP f/u, telehealth and video appts. Patient said he has talked on the phone with PCP, AHSAN Ferrer, and she is aware of his ED visit.      Caitlyn Osman RN  Ambulatory     6/10/2020, 11:59

## 2020-06-14 ENCOUNTER — EPISODE CHANGES (OUTPATIENT)
Dept: CASE MANAGEMENT | Facility: OTHER | Age: 55
End: 2020-06-14

## 2020-08-17 ENCOUNTER — HOSPITAL ENCOUNTER (OUTPATIENT)
Dept: GENERAL RADIOLOGY | Facility: HOSPITAL | Age: 55
Discharge: HOME | End: 2020-08-17
Payer: COMMERCIAL

## 2020-08-17 ENCOUNTER — OUTSIDE FACILITY SERVICE (OUTPATIENT)
Dept: CARDIOLOGY | Facility: CLINIC | Age: 55
End: 2020-08-17

## 2020-08-17 ENCOUNTER — HOSPITAL ENCOUNTER (OUTPATIENT)
Facility: HOSPITAL | Age: 55
Discharge: HOME | End: 2020-08-17
Payer: COMMERCIAL

## 2020-08-17 LAB
ANION GAP SERPL CALCULATED.3IONS-SCNC: 10 MMOL/L (ref 3–16)
BUN BLDV-MCNC: 17 MG/DL (ref 6–20)
CALCIUM SERPL-MCNC: 9.6 MG/DL (ref 8.5–10.5)
CHLORIDE BLD-SCNC: 103 MMOL/L (ref 98–107)
CO2: 26 MMOL/L (ref 20–30)
CREAT SERPL-MCNC: 0.7 MG/DL (ref 0.4–1.2)
GFR AFRICAN AMERICAN: >59
GFR NON-AFRICAN AMERICAN: >59
GLUCOSE BLD-MCNC: 111 MG/DL (ref 74–106)
HCT VFR BLD CALC: 38.9 % (ref 40–54)
HEMOGLOBIN: 13.3 G/DL (ref 13–18)
POTASSIUM SERPL-SCNC: 4.3 MMOL/L (ref 3.4–5.1)
SODIUM BLD-SCNC: 139 MMOL/L (ref 136–145)

## 2020-08-17 PROCEDURE — 72100 X-RAY EXAM L-S SPINE 2/3 VWS: CPT

## 2020-08-17 PROCEDURE — 36415 COLL VENOUS BLD VENIPUNCTURE: CPT

## 2020-08-17 PROCEDURE — 85014 HEMATOCRIT: CPT

## 2020-08-17 PROCEDURE — 72070 X-RAY EXAM THORAC SPINE 2VWS: CPT

## 2020-08-17 PROCEDURE — 93005 ELECTROCARDIOGRAM TRACING: CPT

## 2020-08-17 PROCEDURE — 93010 ELECTROCARDIOGRAM REPORT: CPT | Performed by: INTERNAL MEDICINE

## 2020-08-17 PROCEDURE — 72040 X-RAY EXAM NECK SPINE 2-3 VW: CPT

## 2020-08-17 PROCEDURE — 85018 HEMOGLOBIN: CPT

## 2020-08-17 PROCEDURE — 80048 BASIC METABOLIC PNL TOTAL CA: CPT

## 2020-12-07 ENCOUNTER — HOSPITAL ENCOUNTER (OUTPATIENT)
Facility: HOSPITAL | Age: 55
Discharge: HOME | End: 2020-12-07
Payer: MEDICAID

## 2020-12-07 LAB
A/G RATIO: 2.5 (ref 0.8–2)
ALBUMIN SERPL-MCNC: 5 G/DL (ref 3.4–4.8)
ALP BLD-CCNC: 97 U/L (ref 25–100)
ALT SERPL-CCNC: 24 U/L (ref 4–36)
ANION GAP SERPL CALCULATED.3IONS-SCNC: 12 MMOL/L (ref 3–16)
AST SERPL-CCNC: 23 U/L (ref 8–33)
BASOPHILS ABSOLUTE: 0.1 K/UL (ref 0–0.1)
BASOPHILS RELATIVE PERCENT: 0.6 %
BILIRUB SERPL-MCNC: 0.3 MG/DL (ref 0.3–1.2)
BUN BLDV-MCNC: 9 MG/DL (ref 6–20)
CALCIUM SERPL-MCNC: 9.7 MG/DL (ref 8.5–10.5)
CHLORIDE BLD-SCNC: 103 MMOL/L (ref 98–107)
CHOLESTEROL, TOTAL: 147 MG/DL (ref 0–200)
CO2: 26 MMOL/L (ref 20–30)
CREAT SERPL-MCNC: 0.7 MG/DL (ref 0.4–1.2)
EOSINOPHILS ABSOLUTE: 0.2 K/UL (ref 0–0.4)
EOSINOPHILS RELATIVE PERCENT: 1.7 %
FOLATE: >20 NG/ML
GFR AFRICAN AMERICAN: >59
GFR NON-AFRICAN AMERICAN: >59
GLOBULIN: 2 G/DL
GLUCOSE BLD-MCNC: 116 MG/DL (ref 74–106)
HCT VFR BLD CALC: 42.1 % (ref 40–54)
HDLC SERPL-MCNC: 40 MG/DL (ref 40–60)
HEMOGLOBIN: 14 G/DL (ref 13–18)
IMMATURE GRANULOCYTES #: 0 K/UL
IMMATURE GRANULOCYTES %: 0.4 % (ref 0–5)
LDL CHOLESTEROL CALCULATED: 71 MG/DL
LYMPHOCYTES ABSOLUTE: 2.7 K/UL (ref 1.5–4)
LYMPHOCYTES RELATIVE PERCENT: 28.8 %
MCH RBC QN AUTO: 30.8 PG (ref 27–32)
MCHC RBC AUTO-ENTMCNC: 33.3 G/DL (ref 31–35)
MCV RBC AUTO: 92.5 FL (ref 80–100)
MONOCYTES ABSOLUTE: 0.7 K/UL (ref 0.2–0.8)
MONOCYTES RELATIVE PERCENT: 7.3 %
NEUTROPHILS ABSOLUTE: 5.8 K/UL (ref 2–7.5)
NEUTROPHILS RELATIVE PERCENT: 61.2 %
PDW BLD-RTO: 12.9 % (ref 11–16)
PLATELET # BLD: 289 K/UL (ref 150–400)
PMV BLD AUTO: 9.5 FL (ref 6–10)
POTASSIUM SERPL-SCNC: 4.2 MMOL/L (ref 3.4–5.1)
PROSTATE SPECIFIC ANTIGEN: 0.43 NG/ML (ref 0–4)
RBC # BLD: 4.55 M/UL (ref 4.5–6)
SODIUM BLD-SCNC: 141 MMOL/L (ref 136–145)
TOTAL PROTEIN: 7 G/DL (ref 6.4–8.3)
TRIGL SERPL-MCNC: 179 MG/DL (ref 0–249)
TSH SERPL DL<=0.05 MIU/L-ACNC: 4.11 UIU/ML (ref 0.27–4.2)
VITAMIN B-12: 472 PG/ML (ref 211–911)
VLDLC SERPL CALC-MCNC: 36 MG/DL
WBC # BLD: 9.5 K/UL (ref 4–11)

## 2020-12-07 PROCEDURE — 82607 VITAMIN B-12: CPT

## 2020-12-07 PROCEDURE — 84153 ASSAY OF PSA TOTAL: CPT

## 2020-12-07 PROCEDURE — 80053 COMPREHEN METABOLIC PANEL: CPT

## 2020-12-07 PROCEDURE — 36415 COLL VENOUS BLD VENIPUNCTURE: CPT

## 2020-12-07 PROCEDURE — 80061 LIPID PANEL: CPT

## 2020-12-07 PROCEDURE — 85025 COMPLETE CBC W/AUTO DIFF WBC: CPT

## 2020-12-07 PROCEDURE — 84443 ASSAY THYROID STIM HORMONE: CPT

## 2020-12-07 PROCEDURE — 82746 ASSAY OF FOLIC ACID SERUM: CPT

## 2020-12-15 ENCOUNTER — HOSPITAL ENCOUNTER (OUTPATIENT)
Dept: CT IMAGING | Facility: HOSPITAL | Age: 55
Discharge: HOME | End: 2020-12-15
Payer: MEDICAID

## 2020-12-15 PROCEDURE — 70450 CT HEAD/BRAIN W/O DYE: CPT

## 2021-01-13 DIAGNOSIS — Z01.818 PRE-OP TESTING: Primary | ICD-10-CM

## 2021-01-13 NOTE — TELEPHONE ENCOUNTER
PRESCREENING FOR OPEN ACCESS SCHEDULING    Buzz Cox, 1965  [id number]    01/13/21    If, the patient answers yes to any of the following questions the provider will be informed prior to scheduling open access for approval and documented in the chart.    [x]  Yes  [] No    1. Have you ever had a colonoscopy in the past?      When:        Where:       Polyps or other:     []  Yes  [x] No    2. Family history of colon cancer?      Relation:       Age of onset:       Do you currently have any of the following?    []  Yes  [x] No  Rectal bleeding, if so, how long?     []  Yes  [x] No  Abdominal pain, if so, how long?    []  Yes  [x] No  Constipation, if so, how long?    []  Yes  [x] No  Diarrhea, if so, how long?    []  Yes  [x] No  Weight loss, is so, how much?    [] Yes  [x] No  Small caliber stool, if so, how long?      Have you ever had any of the following conditions?    [] Yes  [x] No  Heart attack?      When?       Last cardiac workup?     Blood thinners?    [] Yes  [x] No   Lung problems, asthma or COPD?  [] Yes  [x] No  Oxygen required?       [] Yes  [x] No  Stroke?     [] Yes  [x] No  Have you ever had a reaction to anesthesia?

## 2021-01-14 ENCOUNTER — PREP FOR SURGERY (OUTPATIENT)
Dept: OTHER | Facility: HOSPITAL | Age: 56
End: 2021-01-14

## 2021-01-14 DIAGNOSIS — Z12.11 COLON CANCER SCREENING: Primary | ICD-10-CM

## 2021-01-14 RX ORDER — POLYETHYLENE GLYCOL 3350 17 G/17G
POWDER, FOR SOLUTION ORAL
Qty: 238 G | Refills: 0 | Status: SHIPPED | OUTPATIENT
Start: 2021-01-14

## 2021-01-14 RX ORDER — BISACODYL 5 MG
TABLET, DELAYED RELEASE (ENTERIC COATED) ORAL
Qty: 4 TABLET | Refills: 0 | Status: SHIPPED | OUTPATIENT
Start: 2021-01-14

## 2021-02-24 ENCOUNTER — HOSPITAL ENCOUNTER (OUTPATIENT)
Facility: HOSPITAL | Age: 56
Discharge: HOME | End: 2021-02-24
Payer: MEDICAID

## 2021-02-24 LAB — SARS-COV-2, NAAT: NOT DETECTED

## 2021-02-24 PROCEDURE — 87635 SARS-COV-2 COVID-19 AMP PRB: CPT

## 2021-02-25 ENCOUNTER — TELEPHONE (OUTPATIENT)
Dept: SURGERY | Facility: CLINIC | Age: 56
End: 2021-02-25

## 2021-05-05 ENCOUNTER — HOSPITAL ENCOUNTER (OUTPATIENT)
Facility: HOSPITAL | Age: 56
Discharge: HOME | End: 2021-05-05
Payer: MEDICAID

## 2021-05-05 LAB
A/G RATIO: 2.2 (ref 0.8–2)
ALBUMIN SERPL-MCNC: 4.6 G/DL (ref 3.4–4.8)
ALP BLD-CCNC: 97 U/L (ref 25–100)
ALT SERPL-CCNC: 21 U/L (ref 4–36)
ANION GAP SERPL CALCULATED.3IONS-SCNC: 12 MMOL/L (ref 3–16)
AST SERPL-CCNC: 24 U/L (ref 8–33)
BASOPHILS ABSOLUTE: 0.1 K/UL (ref 0–0.1)
BASOPHILS RELATIVE PERCENT: 0.7 %
BILIRUB SERPL-MCNC: 0.3 MG/DL (ref 0.3–1.2)
BUN BLDV-MCNC: 9 MG/DL (ref 6–20)
CALCIUM SERPL-MCNC: 10 MG/DL (ref 8.5–10.5)
CHLORIDE BLD-SCNC: 105 MMOL/L (ref 98–107)
CHOLESTEROL, TOTAL: 131 MG/DL (ref 0–200)
CO2: 26 MMOL/L (ref 20–30)
CREAT SERPL-MCNC: 0.8 MG/DL (ref 0.4–1.2)
EOSINOPHILS ABSOLUTE: 0.2 K/UL (ref 0–0.4)
EOSINOPHILS RELATIVE PERCENT: 1.9 %
FOLATE: >20 NG/ML
GFR AFRICAN AMERICAN: >59
GFR NON-AFRICAN AMERICAN: >59
GLOBULIN: 2.1 G/DL
GLUCOSE BLD-MCNC: 116 MG/DL (ref 74–106)
HCT VFR BLD CALC: 39.8 % (ref 40–54)
HDLC SERPL-MCNC: 37 MG/DL (ref 40–60)
HEMOGLOBIN: 12.9 G/DL (ref 13–18)
IMMATURE GRANULOCYTES #: 0 K/UL
IMMATURE GRANULOCYTES %: 0.2 % (ref 0–5)
LDL CHOLESTEROL CALCULATED: 80 MG/DL
LYMPHOCYTES ABSOLUTE: 2.5 K/UL (ref 1.5–4)
LYMPHOCYTES RELATIVE PERCENT: 29.6 %
MCH RBC QN AUTO: 29.7 PG (ref 27–32)
MCHC RBC AUTO-ENTMCNC: 32.4 G/DL (ref 31–35)
MCV RBC AUTO: 91.5 FL (ref 80–100)
MONOCYTES ABSOLUTE: 0.8 K/UL (ref 0.2–0.8)
MONOCYTES RELATIVE PERCENT: 9.6 %
NEUTROPHILS ABSOLUTE: 5 K/UL (ref 2–7.5)
NEUTROPHILS RELATIVE PERCENT: 58 %
PDW BLD-RTO: 13.1 % (ref 11–16)
PLATELET # BLD: 281 K/UL (ref 150–400)
PMV BLD AUTO: 9.8 FL (ref 6–10)
POTASSIUM SERPL-SCNC: 4.2 MMOL/L (ref 3.4–5.1)
RBC # BLD: 4.35 M/UL (ref 4.5–6)
SODIUM BLD-SCNC: 143 MMOL/L (ref 136–145)
TOTAL PROTEIN: 6.7 G/DL (ref 6.4–8.3)
TRIGL SERPL-MCNC: 72 MG/DL (ref 0–249)
TSH SERPL DL<=0.05 MIU/L-ACNC: 3.89 UIU/ML (ref 0.27–4.2)
VITAMIN B-12: 500 PG/ML (ref 211–911)
VLDLC SERPL CALC-MCNC: 14 MG/DL
WBC # BLD: 8.6 K/UL (ref 4–11)

## 2021-05-05 PROCEDURE — 80053 COMPREHEN METABOLIC PANEL: CPT

## 2021-05-05 PROCEDURE — 85025 COMPLETE CBC W/AUTO DIFF WBC: CPT

## 2021-05-05 PROCEDURE — 36415 COLL VENOUS BLD VENIPUNCTURE: CPT

## 2021-05-05 PROCEDURE — 82746 ASSAY OF FOLIC ACID SERUM: CPT

## 2021-05-05 PROCEDURE — 80061 LIPID PANEL: CPT

## 2021-05-05 PROCEDURE — 82607 VITAMIN B-12: CPT

## 2021-05-05 PROCEDURE — 84443 ASSAY THYROID STIM HORMONE: CPT

## 2021-08-17 ENCOUNTER — HOSPITAL ENCOUNTER (OUTPATIENT)
Facility: HOSPITAL | Age: 56
Discharge: HOME | End: 2021-08-17
Payer: MEDICAID

## 2021-08-17 ENCOUNTER — HOSPITAL ENCOUNTER (OUTPATIENT)
Dept: GENERAL RADIOLOGY | Facility: HOSPITAL | Age: 56
Discharge: HOME | End: 2021-08-17
Payer: MEDICAID

## 2021-08-17 DIAGNOSIS — N31.1 REFLEX NEUROGENIC BLADDER: ICD-10-CM

## 2021-08-17 DIAGNOSIS — C67.7 ADENOCARCINOMA OF URACHUS (HCC): ICD-10-CM

## 2021-08-17 LAB
A/G RATIO: 2 (ref 0.8–2)
ALBUMIN SERPL-MCNC: 4.7 G/DL (ref 3.4–4.8)
ALP BLD-CCNC: 94 U/L (ref 25–100)
ALT SERPL-CCNC: 20 U/L (ref 4–36)
ANION GAP SERPL CALCULATED.3IONS-SCNC: 11 MMOL/L (ref 3–16)
AST SERPL-CCNC: 19 U/L (ref 8–33)
BASOPHILS ABSOLUTE: 0.1 K/UL (ref 0–0.1)
BASOPHILS RELATIVE PERCENT: 0.7 %
BILIRUB SERPL-MCNC: 0.3 MG/DL (ref 0.3–1.2)
BUN BLDV-MCNC: 11 MG/DL (ref 6–20)
CALCIUM SERPL-MCNC: 9.6 MG/DL (ref 8.5–10.5)
CHLORIDE BLD-SCNC: 101 MMOL/L (ref 98–107)
CHOLESTEROL, TOTAL: 155 MG/DL (ref 0–200)
CO2: 24 MMOL/L (ref 20–30)
CREAT SERPL-MCNC: 0.8 MG/DL (ref 0.4–1.2)
EOSINOPHILS ABSOLUTE: 0.1 K/UL (ref 0–0.4)
EOSINOPHILS RELATIVE PERCENT: 1.2 %
FOLATE: >20 NG/ML
GFR AFRICAN AMERICAN: >59
GFR NON-AFRICAN AMERICAN: >59
GLOBULIN: 2.3 G/DL
GLUCOSE BLD-MCNC: 102 MG/DL (ref 74–106)
HBA1C MFR BLD: 5.8 %
HCT VFR BLD CALC: 39.4 % (ref 40–54)
HDLC SERPL-MCNC: 34 MG/DL (ref 40–60)
HEMOGLOBIN: 13.2 G/DL (ref 13–18)
IMMATURE GRANULOCYTES #: 0 K/UL
IMMATURE GRANULOCYTES %: 0.2 % (ref 0–5)
LDL CHOLESTEROL CALCULATED: 91 MG/DL
LYMPHOCYTES ABSOLUTE: 1.9 K/UL (ref 1.5–4)
LYMPHOCYTES RELATIVE PERCENT: 22.8 %
MCH RBC QN AUTO: 30 PG (ref 27–32)
MCHC RBC AUTO-ENTMCNC: 33.5 G/DL (ref 31–35)
MCV RBC AUTO: 89.5 FL (ref 80–100)
MONOCYTES ABSOLUTE: 0.6 K/UL (ref 0.2–0.8)
MONOCYTES RELATIVE PERCENT: 7.2 %
NEUTROPHILS ABSOLUTE: 5.8 K/UL (ref 2–7.5)
NEUTROPHILS RELATIVE PERCENT: 67.9 %
PDW BLD-RTO: 13.5 % (ref 11–16)
PLATELET # BLD: 308 K/UL (ref 150–400)
PMV BLD AUTO: 9.5 FL (ref 6–10)
POTASSIUM SERPL-SCNC: 4.8 MMOL/L (ref 3.4–5.1)
RBC # BLD: 4.4 M/UL (ref 4.5–6)
SODIUM BLD-SCNC: 136 MMOL/L (ref 136–145)
TOTAL PROTEIN: 7 G/DL (ref 6.4–8.3)
TRIGL SERPL-MCNC: 152 MG/DL (ref 0–249)
TSH SERPL DL<=0.05 MIU/L-ACNC: 2.55 UIU/ML (ref 0.27–4.2)
VITAMIN B-12: 441 PG/ML (ref 211–911)
VITAMIN D 25-HYDROXY: 46.3 (ref 32–100)
VLDLC SERPL CALC-MCNC: 30 MG/DL
WBC # BLD: 8.5 K/UL (ref 4–11)

## 2021-08-17 PROCEDURE — 80053 COMPREHEN METABOLIC PANEL: CPT

## 2021-08-17 PROCEDURE — 36415 COLL VENOUS BLD VENIPUNCTURE: CPT

## 2021-08-17 PROCEDURE — 83036 HEMOGLOBIN GLYCOSYLATED A1C: CPT

## 2021-08-17 PROCEDURE — 82607 VITAMIN B-12: CPT

## 2021-08-17 PROCEDURE — 82306 VITAMIN D 25 HYDROXY: CPT

## 2021-08-17 PROCEDURE — 82746 ASSAY OF FOLIC ACID SERUM: CPT

## 2021-08-17 PROCEDURE — 85025 COMPLETE CBC W/AUTO DIFF WBC: CPT

## 2021-08-17 PROCEDURE — 74018 RADEX ABDOMEN 1 VIEW: CPT

## 2021-08-17 PROCEDURE — 84443 ASSAY THYROID STIM HORMONE: CPT

## 2021-08-17 PROCEDURE — 80061 LIPID PANEL: CPT

## 2021-08-20 ENCOUNTER — HOSPITAL ENCOUNTER (OUTPATIENT)
Dept: CT IMAGING | Facility: HOSPITAL | Age: 56
Discharge: HOME | End: 2021-08-20
Payer: MEDICAID

## 2021-08-20 ENCOUNTER — HOSPITAL ENCOUNTER (OUTPATIENT)
Dept: GENERAL RADIOLOGY | Facility: HOSPITAL | Age: 56
Discharge: HOME | End: 2021-08-20
Payer: MEDICAID

## 2021-08-20 DIAGNOSIS — R59.1 LYMPHADENOPATHY: ICD-10-CM

## 2021-08-20 DIAGNOSIS — N31.1 REFLEX NEUROGENIC BLADDER: ICD-10-CM

## 2021-08-20 PROCEDURE — 6360000004 HC RX CONTRAST MEDICATION: Performed by: UROLOGY

## 2021-08-20 PROCEDURE — 71270 CT THORAX DX C-/C+: CPT

## 2021-08-20 PROCEDURE — 74178 CT ABD&PLV WO CNTR FLWD CNTR: CPT

## 2021-08-20 RX ADMIN — IOPAMIDOL 100 ML: 755 INJECTION, SOLUTION INTRAVENOUS at 14:35

## 2021-12-06 ENCOUNTER — HOSPITAL ENCOUNTER (OUTPATIENT)
Dept: GENERAL RADIOLOGY | Facility: HOSPITAL | Age: 56
Discharge: HOME | End: 2021-12-06
Payer: MEDICAID

## 2021-12-06 ENCOUNTER — HOSPITAL ENCOUNTER (OUTPATIENT)
Facility: HOSPITAL | Age: 56
Discharge: HOME | End: 2021-12-06
Payer: MEDICAID

## 2021-12-06 DIAGNOSIS — Z01.811 PRE-OP CHEST EXAM: ICD-10-CM

## 2021-12-06 DIAGNOSIS — Z01.818 PREOP EXAMINATION: ICD-10-CM

## 2021-12-06 LAB
A/G RATIO: 2.2 (ref 0.8–2)
ALBUMIN SERPL-MCNC: 4.7 G/DL (ref 3.4–4.8)
ALP BLD-CCNC: 101 U/L (ref 25–100)
ALT SERPL-CCNC: 32 U/L (ref 4–36)
ANION GAP SERPL CALCULATED.3IONS-SCNC: 11 MMOL/L (ref 3–16)
AST SERPL-CCNC: 32 U/L (ref 8–33)
BILIRUB SERPL-MCNC: 0.4 MG/DL (ref 0.3–1.2)
BILIRUBIN URINE: NEGATIVE
BLOOD, URINE: NEGATIVE
BUN BLDV-MCNC: 9 MG/DL (ref 6–20)
CALCIUM SERPL-MCNC: 9.7 MG/DL (ref 8.5–10.5)
CHLORIDE BLD-SCNC: 102 MMOL/L (ref 98–107)
CLARITY: CLEAR
CO2: 25 MMOL/L (ref 20–30)
COLOR: YELLOW
CREAT SERPL-MCNC: 0.8 MG/DL (ref 0.4–1.2)
GFR AFRICAN AMERICAN: >59
GFR NON-AFRICAN AMERICAN: >59
GLOBULIN: 2.1 G/DL
GLUCOSE BLD-MCNC: 95 MG/DL (ref 74–106)
GLUCOSE URINE: NEGATIVE MG/DL
HCT VFR BLD CALC: 39.2 % (ref 40–54)
HEMOGLOBIN: 13 G/DL (ref 13–18)
KETONES, URINE: NEGATIVE MG/DL
LEUKOCYTE ESTERASE, URINE: NEGATIVE
MCH RBC QN AUTO: 30 PG (ref 27–32)
MCHC RBC AUTO-ENTMCNC: 33.2 G/DL (ref 31–35)
MCV RBC AUTO: 90.3 FL (ref 80–100)
MICROSCOPIC EXAMINATION: NORMAL
NITRITE, URINE: NEGATIVE
PDW BLD-RTO: 13.1 % (ref 11–16)
PH UA: 5.5 (ref 5–8)
PLATELET # BLD: 273 K/UL (ref 150–400)
PMV BLD AUTO: 8.7 FL (ref 6–10)
POTASSIUM SERPL-SCNC: 4.7 MMOL/L (ref 3.4–5.1)
PROTEIN UA: NEGATIVE MG/DL
RBC # BLD: 4.34 M/UL (ref 4.5–6)
SARS-COV-2, PCR: NOT DETECTED
SODIUM BLD-SCNC: 138 MMOL/L (ref 136–145)
SPECIFIC GRAVITY UA: 1.01 (ref 1–1.03)
TOTAL PROTEIN: 6.8 G/DL (ref 6.4–8.3)
URINE TYPE: NORMAL
UROBILINOGEN, URINE: 0.2 E.U./DL
WBC # BLD: 8.6 K/UL (ref 4–11)

## 2021-12-06 PROCEDURE — U0005 INFEC AGEN DETEC AMPLI PROBE: HCPCS

## 2021-12-06 PROCEDURE — 93005 ELECTROCARDIOGRAM TRACING: CPT

## 2021-12-06 PROCEDURE — 80053 COMPREHEN METABOLIC PANEL: CPT

## 2021-12-06 PROCEDURE — U0003 INFECTIOUS AGENT DETECTION BY NUCLEIC ACID (DNA OR RNA); SEVERE ACUTE RESPIRATORY SYNDROME CORONAVIRUS 2 (SARS-COV-2) (CORONAVIRUS DISEASE [COVID-19]), AMPLIFIED PROBE TECHNIQUE, MAKING USE OF HIGH THROUGHPUT TECHNOLOGIES AS DESCRIBED BY CMS-2020-01-R: HCPCS

## 2021-12-06 PROCEDURE — 81003 URINALYSIS AUTO W/O SCOPE: CPT

## 2021-12-06 PROCEDURE — 74018 RADEX ABDOMEN 1 VIEW: CPT

## 2021-12-06 PROCEDURE — 36415 COLL VENOUS BLD VENIPUNCTURE: CPT

## 2021-12-06 PROCEDURE — 71046 X-RAY EXAM CHEST 2 VIEWS: CPT

## 2021-12-06 PROCEDURE — 85027 COMPLETE CBC AUTOMATED: CPT

## 2021-12-06 PROCEDURE — 87086 URINE CULTURE/COLONY COUNT: CPT

## 2021-12-07 LAB — URINE CULTURE, ROUTINE: NORMAL

## 2021-12-09 LAB
EKG ATRIAL RATE: 56 BPM
EKG DIAGNOSIS: NORMAL
EKG P AXIS: 25 DEGREES
EKG P-R INTERVAL: 204 MS
EKG Q-T INTERVAL: 394 MS
EKG QRS DURATION: 92 MS
EKG QTC CALCULATION (BAZETT): 380 MS
EKG R AXIS: 66 DEGREES
EKG T AXIS: 45 DEGREES
EKG VENTRICULAR RATE: 56 BPM

## 2022-03-25 ENCOUNTER — HOSPITAL ENCOUNTER (OUTPATIENT)
Facility: HOSPITAL | Age: 57
Discharge: HOME | End: 2022-03-25
Payer: MEDICAID

## 2022-03-25 PROCEDURE — 87086 URINE CULTURE/COLONY COUNT: CPT

## 2022-03-27 LAB — URINE CULTURE, ROUTINE: NORMAL

## 2022-09-21 ENCOUNTER — HOSPITAL ENCOUNTER (OUTPATIENT)
Facility: HOSPITAL | Age: 57
Discharge: HOME | End: 2022-09-21
Payer: MEDICAID

## 2022-09-21 LAB
A/G RATIO: 2.5 (ref 0.8–2)
ALBUMIN SERPL-MCNC: 5 G/DL (ref 3.4–4.8)
ALP BLD-CCNC: 88 U/L (ref 25–100)
ALT SERPL-CCNC: 23 U/L (ref 4–36)
ANION GAP SERPL CALCULATED.3IONS-SCNC: 10 MMOL/L (ref 3–16)
AST SERPL-CCNC: 23 U/L (ref 8–33)
BASOPHILS ABSOLUTE: 0.1 K/UL (ref 0–0.1)
BASOPHILS RELATIVE PERCENT: 0.7 %
BILIRUB SERPL-MCNC: 0.4 MG/DL (ref 0.3–1.2)
BUN BLDV-MCNC: 16 MG/DL (ref 6–20)
CALCIUM SERPL-MCNC: 9.7 MG/DL (ref 8.5–10.5)
CHLORIDE BLD-SCNC: 102 MMOL/L (ref 98–107)
CHOLESTEROL, TOTAL: 150 MG/DL (ref 0–200)
CO2: 25 MMOL/L (ref 20–30)
CREAT SERPL-MCNC: 0.8 MG/DL (ref 0.4–1.2)
EOSINOPHILS ABSOLUTE: 0.2 K/UL (ref 0–0.4)
EOSINOPHILS RELATIVE PERCENT: 2.2 %
FOLATE: >20 NG/ML
GFR AFRICAN AMERICAN: >59
GFR NON-AFRICAN AMERICAN: >59
GLOBULIN: 2 G/DL
GLUCOSE BLD-MCNC: 110 MG/DL (ref 74–106)
HCT VFR BLD CALC: 40.5 % (ref 40–54)
HDLC SERPL-MCNC: 43 MG/DL (ref 40–60)
HEMOGLOBIN: 13.8 G/DL (ref 13–18)
IMMATURE GRANULOCYTES #: 0 K/UL
IMMATURE GRANULOCYTES %: 0.4 % (ref 0–5)
LDL CHOLESTEROL CALCULATED: 87 MG/DL
LYMPHOCYTES ABSOLUTE: 2.2 K/UL (ref 1.5–4)
LYMPHOCYTES RELATIVE PERCENT: 23 %
MCH RBC QN AUTO: 30.7 PG (ref 27–32)
MCHC RBC AUTO-ENTMCNC: 34.1 G/DL (ref 31–35)
MCV RBC AUTO: 90.2 FL (ref 80–100)
MONOCYTES ABSOLUTE: 0.8 K/UL (ref 0.2–0.8)
MONOCYTES RELATIVE PERCENT: 8.5 %
NEUTROPHILS ABSOLUTE: 6.4 K/UL (ref 2–7.5)
NEUTROPHILS RELATIVE PERCENT: 65.2 %
PDW BLD-RTO: 13.8 % (ref 11–16)
PLATELET # BLD: 263 K/UL (ref 150–400)
PMV BLD AUTO: 9.2 FL (ref 6–10)
POTASSIUM SERPL-SCNC: 4.6 MMOL/L (ref 3.4–5.1)
RBC # BLD: 4.49 M/UL (ref 4.5–6)
SODIUM BLD-SCNC: 137 MMOL/L (ref 136–145)
TOTAL PROTEIN: 7 G/DL (ref 6.4–8.3)
TRIGL SERPL-MCNC: 102 MG/DL (ref 0–249)
TSH SERPL DL<=0.05 MIU/L-ACNC: 2.75 UIU/ML (ref 0.27–4.2)
VITAMIN B-12: 405 PG/ML (ref 211–911)
VLDLC SERPL CALC-MCNC: 20 MG/DL
WBC # BLD: 9.8 K/UL (ref 4–11)

## 2022-09-21 PROCEDURE — 84443 ASSAY THYROID STIM HORMONE: CPT

## 2022-09-21 PROCEDURE — 80061 LIPID PANEL: CPT

## 2022-09-21 PROCEDURE — 36415 COLL VENOUS BLD VENIPUNCTURE: CPT

## 2022-09-21 PROCEDURE — 82746 ASSAY OF FOLIC ACID SERUM: CPT

## 2022-09-21 PROCEDURE — 82607 VITAMIN B-12: CPT

## 2022-09-21 PROCEDURE — 80053 COMPREHEN METABOLIC PANEL: CPT

## 2022-09-21 PROCEDURE — 85025 COMPLETE CBC W/AUTO DIFF WBC: CPT

## 2022-10-12 ENCOUNTER — HOSPITAL ENCOUNTER (OUTPATIENT)
Dept: GENERAL RADIOLOGY | Facility: HOSPITAL | Age: 57
Discharge: HOME | End: 2022-10-12
Payer: MEDICAID

## 2022-10-12 ENCOUNTER — HOSPITAL ENCOUNTER (OUTPATIENT)
Facility: HOSPITAL | Age: 57
Discharge: HOME | End: 2022-10-12
Payer: MEDICAID

## 2022-10-12 DIAGNOSIS — N30.00 ACUTE ON CHRONIC CYSTITIS: ICD-10-CM

## 2022-10-12 DIAGNOSIS — M79.642 LEFT HAND PAIN: ICD-10-CM

## 2022-10-12 DIAGNOSIS — N30.20 ACUTE ON CHRONIC CYSTITIS: ICD-10-CM

## 2022-10-12 PROCEDURE — 73130 X-RAY EXAM OF HAND: CPT

## 2022-10-12 PROCEDURE — 74018 RADEX ABDOMEN 1 VIEW: CPT

## 2023-01-10 ENCOUNTER — HOSPITAL ENCOUNTER (OUTPATIENT)
Facility: HOSPITAL | Age: 58
Discharge: HOME | End: 2023-01-10
Payer: MEDICAID

## 2023-01-10 ENCOUNTER — HOSPITAL ENCOUNTER (OUTPATIENT)
Dept: GENERAL RADIOLOGY | Facility: HOSPITAL | Age: 58
Discharge: HOME | End: 2023-01-10
Payer: MEDICAID

## 2023-01-10 DIAGNOSIS — C67.0 MALIGNANT NEOPLASM OF TRIGONE OF URINARY BLADDER (HCC): ICD-10-CM

## 2023-01-10 DIAGNOSIS — R10.9 FLANK PAIN: ICD-10-CM

## 2023-01-10 LAB
A/G RATIO: 2.1 (ref 0.8–2)
ALBUMIN SERPL-MCNC: 5 G/DL (ref 3.4–4.8)
ALP BLD-CCNC: 97 U/L (ref 25–100)
ALT SERPL-CCNC: 23 U/L (ref 4–36)
ANION GAP SERPL CALCULATED.3IONS-SCNC: 11 MMOL/L (ref 3–16)
AST SERPL-CCNC: 21 U/L (ref 8–33)
BILIRUB SERPL-MCNC: 0.4 MG/DL (ref 0.3–1.2)
BILIRUBIN URINE: NEGATIVE
BLOOD, URINE: NEGATIVE
BUN BLDV-MCNC: 12 MG/DL (ref 6–20)
CALCIUM SERPL-MCNC: 9.7 MG/DL (ref 8.5–10.5)
CHLORIDE BLD-SCNC: 102 MMOL/L (ref 98–107)
CLARITY: CLEAR
CO2: 25 MMOL/L (ref 20–30)
COLOR: YELLOW
CREAT SERPL-MCNC: 0.8 MG/DL (ref 0.4–1.2)
GFR SERPL CREATININE-BSD FRML MDRD: >60 ML/MIN/{1.73_M2}
GLOBULIN: 2.4 G/DL
GLUCOSE BLD-MCNC: 95 MG/DL (ref 74–106)
GLUCOSE URINE: NEGATIVE MG/DL
KETONES, URINE: NEGATIVE MG/DL
LEUKOCYTE ESTERASE, URINE: NEGATIVE
MICROSCOPIC EXAMINATION: NORMAL
NITRITE, URINE: NEGATIVE
PH UA: 6 (ref 5–8)
POTASSIUM SERPL-SCNC: 4.3 MMOL/L (ref 3.4–5.1)
PROSTATE SPECIFIC ANTIGEN: 0.6 NG/ML (ref 0–4)
PROTEIN UA: NEGATIVE MG/DL
SODIUM BLD-SCNC: 138 MMOL/L (ref 136–145)
SPECIFIC GRAVITY UA: <=1.005 (ref 1–1.03)
TOTAL PROTEIN: 7.4 G/DL (ref 6.4–8.3)
URINE TYPE: NORMAL
UROBILINOGEN, URINE: 0.2 E.U./DL

## 2023-01-10 PROCEDURE — 74018 RADEX ABDOMEN 1 VIEW: CPT

## 2023-01-10 PROCEDURE — 80053 COMPREHEN METABOLIC PANEL: CPT

## 2023-01-10 PROCEDURE — 84153 ASSAY OF PSA TOTAL: CPT

## 2023-01-10 PROCEDURE — 36415 COLL VENOUS BLD VENIPUNCTURE: CPT

## 2023-01-10 PROCEDURE — 87086 URINE CULTURE/COLONY COUNT: CPT

## 2023-01-10 PROCEDURE — 81003 URINALYSIS AUTO W/O SCOPE: CPT

## 2023-01-11 LAB — URINE CULTURE, ROUTINE: NORMAL

## 2023-01-20 ENCOUNTER — TRANSCRIBE ORDERS (OUTPATIENT)
Dept: ADMINISTRATIVE | Age: 58
End: 2023-01-20

## 2023-01-20 DIAGNOSIS — N20.0 CALCULUS, KIDNEY: Primary | ICD-10-CM

## 2023-01-20 DIAGNOSIS — C61 PROSTATE CANCER (HCC): ICD-10-CM

## 2023-02-27 ENCOUNTER — HOSPITAL ENCOUNTER (OUTPATIENT)
Dept: GENERAL RADIOLOGY | Facility: HOSPITAL | Age: 58
Discharge: HOME | End: 2023-02-27
Payer: MEDICAID

## 2023-02-27 ENCOUNTER — HOSPITAL ENCOUNTER (OUTPATIENT)
Facility: HOSPITAL | Age: 58
Discharge: HOME | End: 2023-02-27
Payer: MEDICAID

## 2023-02-27 ENCOUNTER — HOSPITAL ENCOUNTER (OUTPATIENT)
Dept: CT IMAGING | Facility: HOSPITAL | Age: 58
Discharge: HOME | End: 2023-02-27
Payer: MEDICAID

## 2023-02-27 DIAGNOSIS — N30.00 ACUTE ON CHRONIC CYSTITIS: ICD-10-CM

## 2023-02-27 DIAGNOSIS — N30.20 ACUTE ON CHRONIC CYSTITIS: ICD-10-CM

## 2023-02-27 DIAGNOSIS — N20.0 CALCULUS, KIDNEY: ICD-10-CM

## 2023-02-27 DIAGNOSIS — C61 PROSTATE CANCER (HCC): ICD-10-CM

## 2023-02-27 LAB
A/G RATIO: 2 (ref 0.8–2)
ALBUMIN SERPL-MCNC: 4.6 G/DL (ref 3.4–4.8)
ALP BLD-CCNC: 76 U/L (ref 25–100)
ALT SERPL-CCNC: 24 U/L (ref 4–36)
ANION GAP SERPL CALCULATED.3IONS-SCNC: 10 MMOL/L (ref 3–16)
AST SERPL-CCNC: 20 U/L (ref 8–33)
BILIRUB SERPL-MCNC: <0.2 MG/DL (ref 0.3–1.2)
BILIRUBIN URINE: NEGATIVE
BLOOD, URINE: NEGATIVE
BUN BLDV-MCNC: 13 MG/DL (ref 6–20)
CALCIUM SERPL-MCNC: 9.6 MG/DL (ref 8.5–10.5)
CHLORIDE BLD-SCNC: 102 MMOL/L (ref 98–107)
CLARITY: CLEAR
CO2: 28 MMOL/L (ref 20–30)
COLOR: YELLOW
CREAT SERPL-MCNC: 0.7 MG/DL (ref 0.4–1.2)
GFR SERPL CREATININE-BSD FRML MDRD: >60 ML/MIN/{1.73_M2}
GLOBULIN: 2.3 G/DL
GLUCOSE BLD-MCNC: 103 MG/DL (ref 74–106)
GLUCOSE URINE: NEGATIVE MG/DL
KETONES, URINE: NEGATIVE MG/DL
LEUKOCYTE ESTERASE, URINE: NEGATIVE
MICROSCOPIC EXAMINATION: NORMAL
NITRITE, URINE: NEGATIVE
PH UA: 5.5 (ref 5–8)
POTASSIUM SERPL-SCNC: 4.4 MMOL/L (ref 3.4–5.1)
PROTEIN UA: NEGATIVE MG/DL
SODIUM BLD-SCNC: 140 MMOL/L (ref 136–145)
SPECIFIC GRAVITY UA: 1.02 (ref 1–1.03)
TOTAL PROTEIN: 6.9 G/DL (ref 6.4–8.3)
URINE TYPE: NORMAL
UROBILINOGEN, URINE: 0.2 E.U./DL

## 2023-02-27 PROCEDURE — 74176 CT ABD & PELVIS W/O CONTRAST: CPT

## 2023-02-27 PROCEDURE — 36415 COLL VENOUS BLD VENIPUNCTURE: CPT

## 2023-02-27 PROCEDURE — 74018 RADEX ABDOMEN 1 VIEW: CPT

## 2023-02-27 PROCEDURE — 81003 URINALYSIS AUTO W/O SCOPE: CPT

## 2023-02-27 PROCEDURE — 80053 COMPREHEN METABOLIC PANEL: CPT

## 2023-02-27 PROCEDURE — 87086 URINE CULTURE/COLONY COUNT: CPT

## 2023-02-28 LAB — URINE CULTURE, ROUTINE: NORMAL

## 2023-10-05 ENCOUNTER — HOSPITAL ENCOUNTER (OUTPATIENT)
Facility: HOSPITAL | Age: 58
Discharge: HOME | End: 2023-10-05
Payer: MEDICAID

## 2023-10-05 ENCOUNTER — HOSPITAL ENCOUNTER (OUTPATIENT)
Dept: GENERAL RADIOLOGY | Facility: HOSPITAL | Age: 58
Discharge: HOME | End: 2023-10-05
Payer: MEDICAID

## 2023-10-05 DIAGNOSIS — N30.20 CHRONIC CYSTITIS: ICD-10-CM

## 2023-10-05 LAB
ALBUMIN SERPL-MCNC: 4.8 G/DL (ref 3.4–4.8)
ALBUMIN/GLOB SERPL: 2.4 {RATIO} (ref 0.8–2)
ALP SERPL-CCNC: 78 U/L (ref 25–100)
ALT SERPL-CCNC: 21 U/L (ref 4–36)
ANION GAP SERPL CALCULATED.3IONS-SCNC: 10 MMOL/L (ref 3–16)
AST SERPL-CCNC: 19 U/L (ref 8–33)
BILIRUB DIRECT SERPL-MCNC: <0.2 MG/DL (ref 0–0.2)
BILIRUB INDIRECT SERPL-MCNC: NORMAL MG/DL (ref 0.2–0.8)
BILIRUB SERPL-MCNC: 0.3 MG/DL (ref 0.3–1.2)
BILIRUB UR QL STRIP.AUTO: NEGATIVE
BUN SERPL-MCNC: 21 MG/DL (ref 6–20)
CALCIUM SERPL-MCNC: 9.3 MG/DL (ref 8.5–10.5)
CHLORIDE SERPL-SCNC: 102 MMOL/L (ref 98–107)
CHOLEST SERPL-MCNC: 143 MG/DL (ref 0–200)
CLARITY UR: CLEAR
CO2 SERPL-SCNC: 26 MMOL/L (ref 20–30)
COLOR UR: YELLOW
CREAT SERPL-MCNC: 0.9 MG/DL (ref 0.4–1.2)
ERYTHROCYTE [DISTWIDTH] IN BLOOD BY AUTOMATED COUNT: 13.1 % (ref 11–16)
GFR SERPLBLD CREATININE-BSD FMLA CKD-EPI: >60 ML/MIN/{1.73_M2}
GLOBULIN SER CALC-MCNC: 2 G/DL
GLUCOSE SERPL-MCNC: 109 MG/DL (ref 74–106)
GLUCOSE UR STRIP.AUTO-MCNC: NEGATIVE MG/DL
HCT VFR BLD AUTO: 38.5 % (ref 40–54)
HDLC SERPL-MCNC: 37 MG/DL (ref 40–60)
HGB BLD-MCNC: 13.6 G/DL (ref 13–18)
HGB UR QL STRIP.AUTO: NEGATIVE
KETONES UR STRIP.AUTO-MCNC: NEGATIVE MG/DL
LDLC SERPL CALC-MCNC: 84 MG/DL
LEUKOCYTE ESTERASE UR QL STRIP.AUTO: NEGATIVE
MCH RBC QN AUTO: 31.2 PG (ref 27–32)
MCHC RBC AUTO-ENTMCNC: 35.3 G/DL (ref 31–35)
MCV RBC AUTO: 88.3 FL (ref 80–100)
NITRITE UR QL STRIP.AUTO: NEGATIVE
PH UR STRIP.AUTO: 6 [PH] (ref 5–8)
PLATELET # BLD AUTO: 225 K/UL (ref 150–400)
PMV BLD AUTO: 8.8 FL (ref 6–10)
POTASSIUM SERPL-SCNC: 4.3 MMOL/L (ref 3.4–5.1)
PROT SERPL-MCNC: 6.8 G/DL (ref 6.4–8.3)
PROT UR STRIP.AUTO-MCNC: NEGATIVE MG/DL
PSA SERPL DL<=0.01 NG/ML-MCNC: 0.59 NG/ML (ref 0–4)
RBC # BLD AUTO: 4.36 M/UL (ref 4.5–6)
SODIUM SERPL-SCNC: 138 MMOL/L (ref 136–145)
SP GR UR STRIP.AUTO: 1.02 (ref 1–1.03)
TRIGL SERPL-MCNC: 112 MG/DL (ref 0–249)
UA DIPSTICK W REFLEX MICRO PNL UR: NORMAL
URN SPEC COLLECT METH UR: NORMAL
UROBILINOGEN UR STRIP-ACNC: 0.2 E.U./DL
VLDLC SERPL CALC-MCNC: 22 MG/DL
WBC # BLD AUTO: 8 K/UL (ref 4–11)

## 2023-10-05 PROCEDURE — 80053 COMPREHEN METABOLIC PANEL: CPT

## 2023-10-05 PROCEDURE — 81003 URINALYSIS AUTO W/O SCOPE: CPT

## 2023-10-05 PROCEDURE — 85027 COMPLETE CBC AUTOMATED: CPT

## 2023-10-05 PROCEDURE — 84153 ASSAY OF PSA TOTAL: CPT

## 2023-10-05 PROCEDURE — 80061 LIPID PANEL: CPT

## 2023-10-05 PROCEDURE — 74018 RADEX ABDOMEN 1 VIEW: CPT

## 2023-10-05 PROCEDURE — 82248 BILIRUBIN DIRECT: CPT

## 2023-10-05 PROCEDURE — 36415 COLL VENOUS BLD VENIPUNCTURE: CPT

## 2023-10-05 PROCEDURE — 87086 URINE CULTURE/COLONY COUNT: CPT

## 2023-10-06 LAB — BACTERIA UR CULT: NORMAL

## 2023-11-14 ENCOUNTER — HOSPITAL ENCOUNTER (OUTPATIENT)
Facility: HOSPITAL | Age: 58
Discharge: HOME | End: 2023-11-14
Payer: MEDICAID

## 2023-11-14 LAB
25(OH)D3 SERPL-MCNC: 37.8 NG/ML (ref 32–100)
ALBUMIN SERPL-MCNC: 4.7 G/DL (ref 3.4–4.8)
ALBUMIN/GLOB SERPL: 2.4 {RATIO} (ref 0.8–2)
ALP SERPL-CCNC: 72 U/L (ref 25–100)
ALT SERPL-CCNC: 25 U/L (ref 4–36)
ANION GAP SERPL CALCULATED.3IONS-SCNC: 12 MMOL/L (ref 3–16)
AST SERPL-CCNC: 41 U/L (ref 8–33)
BASOPHILS # BLD: 0.1 K/UL (ref 0–0.1)
BASOPHILS NFR BLD: 0.9 %
BILIRUB SERPL-MCNC: 0.3 MG/DL (ref 0.3–1.2)
BUN SERPL-MCNC: 11 MG/DL (ref 6–20)
CALCIUM SERPL-MCNC: 9.6 MG/DL (ref 8.5–10.5)
CHLORIDE SERPL-SCNC: 103 MMOL/L (ref 98–107)
CHOLEST SERPL-MCNC: 141 MG/DL (ref 0–200)
CO2 SERPL-SCNC: 23 MMOL/L (ref 20–30)
CREAT SERPL-MCNC: <0.5 MG/DL (ref 0.4–1.2)
EOSINOPHIL # BLD: 0.2 K/UL (ref 0–0.4)
EOSINOPHIL NFR BLD: 2.3 %
ERYTHROCYTE [DISTWIDTH] IN BLOOD BY AUTOMATED COUNT: 13.2 % (ref 11–16)
FOLATE SERPL-MCNC: >20 NG/ML
GFR SERPLBLD CREATININE-BSD FMLA CKD-EPI: >60 ML/MIN/{1.73_M2}
GLOBULIN SER CALC-MCNC: 2 G/DL
GLUCOSE SERPL-MCNC: 135 MG/DL (ref 74–106)
HBA1C MFR BLD: 5.9 %
HCT VFR BLD AUTO: 38.8 % (ref 40–54)
HDLC SERPL-MCNC: 36 MG/DL (ref 40–60)
HGB BLD-MCNC: 13.6 G/DL (ref 13–18)
IMM GRANULOCYTES # BLD: 0 K/UL
IMM GRANULOCYTES NFR BLD: 0.2 % (ref 0–5)
LDLC SERPL CALC-MCNC: 84 MG/DL
LYMPHOCYTES # BLD: 2.5 K/UL (ref 1.5–4)
LYMPHOCYTES NFR BLD: 30.6 %
MCH RBC QN AUTO: 30.6 PG (ref 27–32)
MCHC RBC AUTO-ENTMCNC: 35.1 G/DL (ref 31–35)
MCV RBC AUTO: 87.4 FL (ref 80–100)
MONOCYTES # BLD: 0.8 K/UL (ref 0.2–0.8)
MONOCYTES NFR BLD: 10.2 %
NEUTROPHILS # BLD: 4.5 K/UL (ref 2–7.5)
NEUTS SEG NFR BLD: 55.8 %
PLATELET # BLD AUTO: 276 K/UL (ref 150–400)
PMV BLD AUTO: 9.1 FL (ref 6–10)
POTASSIUM SERPL-SCNC: 3.9 MMOL/L (ref 3.4–5.1)
PROT SERPL-MCNC: 6.7 G/DL (ref 6.4–8.3)
RBC # BLD AUTO: 4.44 M/UL (ref 4.5–6)
SODIUM SERPL-SCNC: 138 MMOL/L (ref 136–145)
TRIGL SERPL-MCNC: 105 MG/DL (ref 0–249)
TSH SERPL DL<=0.005 MIU/L-ACNC: 4.21 UIU/ML (ref 0.27–4.2)
VIT B12 SERPL-MCNC: 518 PG/ML (ref 211–911)
VLDLC SERPL CALC-MCNC: 21 MG/DL
WBC # BLD AUTO: 8.1 K/UL (ref 4–11)

## 2023-11-14 PROCEDURE — 80053 COMPREHEN METABOLIC PANEL: CPT

## 2023-11-14 PROCEDURE — 83036 HEMOGLOBIN GLYCOSYLATED A1C: CPT

## 2023-11-14 PROCEDURE — 84443 ASSAY THYROID STIM HORMONE: CPT

## 2023-11-14 PROCEDURE — 85025 COMPLETE CBC W/AUTO DIFF WBC: CPT

## 2023-11-14 PROCEDURE — 82607 VITAMIN B-12: CPT

## 2023-11-14 PROCEDURE — 82306 VITAMIN D 25 HYDROXY: CPT

## 2023-11-14 PROCEDURE — 80061 LIPID PANEL: CPT

## 2023-11-14 PROCEDURE — 82746 ASSAY OF FOLIC ACID SERUM: CPT

## 2023-11-16 ENCOUNTER — TELEPHONE (OUTPATIENT)
Dept: SURGERY | Facility: CLINIC | Age: 58
End: 2023-11-16
Payer: COMMERCIAL

## 2023-11-16 NOTE — TELEPHONE ENCOUNTER
PRESCREENING FOR OPEN ACCESS SCHEDULING    Buzz Cox, 1965  [id number]    11/16/23    If, the patient answers yes to any of the following questions the provider will be informed prior to scheduling open access for approval and documented in the chart.    [x]  Yes  [] No    1. Have you ever had a colonoscopy in the past?      When:  about 7yrs       Where: [facility]      Polyps or other:     []  Yes  [x] No    2. Family history of colon cancer?      Relation:       Age of onset:       Do you currently have any of the following?    []  Yes  [x] No  Rectal bleeding, if so, how long?     []  Yes  [x] No  Abdominal pain, if so, how long?    [x]  Yes  [] No  Constipation, if so, how long? Been going on for a while    []  Yes  [x] No  Diarrhea, if so, how long?    []  Yes  [x] No  Weight loss, is so, how much?    [] Yes  [x] No  Small caliber stool, if so, how long?      Have you ever had any of the following conditions?    [] Yes  [] No  Heart attack?      When?       Last cardiac workup?     Blood thinners?    [] Yes  [] No   Lung problems, asthma or COPD?  [] Yes  [] No  Oxygen required?       [] Yes  [] No  Stroke?     [] Yes  [] No  Have you ever had a reaction to anesthesia?

## 2023-11-17 ENCOUNTER — HOSPITAL ENCOUNTER (OUTPATIENT)
Dept: CT IMAGING | Facility: HOSPITAL | Age: 58
Discharge: HOME | End: 2023-11-17
Payer: MEDICAID

## 2023-11-17 DIAGNOSIS — R07.1 CHEST PAIN MADE WORSE BY BREATHING: ICD-10-CM

## 2023-11-17 DIAGNOSIS — R06.02 SHORTNESS OF BREATH: ICD-10-CM

## 2023-11-17 DIAGNOSIS — Z85.51 H/O CARCINOMA OF BLADDER: ICD-10-CM

## 2023-11-17 PROCEDURE — 6360000004 HC RX CONTRAST MEDICATION: Performed by: NURSE PRACTITIONER

## 2023-11-17 PROCEDURE — 71270 CT THORAX DX C-/C+: CPT

## 2023-11-17 PROCEDURE — 74178 CT ABD&PLV WO CNTR FLWD CNTR: CPT

## 2023-11-17 RX ADMIN — IOPAMIDOL 100 ML: 755 INJECTION, SOLUTION INTRAVENOUS at 10:48

## 2023-12-05 NOTE — PROGRESS NOTES
Patient: Buzz oCx    YOB: 1965    Date: 12/07/2023    Primary Care Provider: Beata Lou APRN    Chief Complaint   Patient presents with    Colonoscopy       SUBJECTIVE:    History of present illness:  I saw the patient in the office today as a consultation for evaluation and treatment of GERD. Patient is also being seen for an evaluation for a colonoscopy. Patient complains of significant right chest pain associated with reflux.  Cardiology evaluation and stress test recently was negative.  No rectal bleeding or change in bowel habits.  No previous colonoscopy.  Patient on PPI medication with occasional breakthrough symptoms.    The following portions of the patient's history were reviewed and updated as appropriate: allergies, current medications, past family history, past medical history, past social history, past surgical history and problem list.    Review of Systems   Constitutional:  Negative for chills, diaphoresis, fatigue, fever and unexpected weight change.   HENT:  Negative for dental problem, drooling, ear discharge, hearing loss, trouble swallowing and voice change.    Eyes:  Negative for visual disturbance.   Respiratory:  Negative for apnea, cough, choking, chest tightness, shortness of breath and wheezing.    Cardiovascular:  Negative for chest pain, palpitations and leg swelling.   Gastrointestinal:  Negative for abdominal distention, abdominal pain, anal bleeding, blood in stool, constipation, diarrhea, nausea, rectal pain and vomiting.   Endocrine: Negative for cold intolerance and heat intolerance.   Genitourinary:  Negative for difficulty urinating, dysuria, flank pain, frequency, hematuria, scrotal swelling and testicular pain.   Musculoskeletal:  Negative for back pain, gait problem and joint swelling.   Skin:  Negative for color change, rash and wound.   Neurological:  Negative for dizziness, seizures, syncope, speech difficulty, weakness, light-headedness, numbness  and headaches.   Hematological:  Negative for adenopathy. Does not bruise/bleed easily.   Psychiatric/Behavioral:  Negative for agitation, behavioral problems and confusion. The patient is not nervous/anxious.        History:  Past Medical History:   Diagnosis Date    Arthritis     CAD (coronary artery disease)     Cancer     Hyperlipidemia     Hypertension     Myocardial infarction        Past Surgical History:   Procedure Laterality Date    BLADDER TUMOR EXCISION      CARDIAC SURGERY      CORONARY ANGIOPLASTY WITH STENT PLACEMENT      SHOULDER SURGERY Right        Family History   Problem Relation Age of Onset    No Known Problems Mother     No Known Problems Father        Social History     Tobacco Use    Smoking status: Former    Smokeless tobacco: Never   Vaping Use    Vaping Use: Never used   Substance Use Topics    Alcohol use: No    Drug use: No       Allergies:  No Known Allergies    Medications:    Current Outpatient Medications:     aspirin 81 MG EC tablet, Take 1 tablet by mouth Daily., Disp: , Rfl:     atorvastatin (LIPITOR) 20 MG tablet, Take 1 tablet by mouth Daily., Disp: , Rfl:     baclofen (LIORESAL) 10 MG tablet, Take 1 tablet by mouth 3 times a day., Disp: , Rfl:     colchicine 0.6 MG tablet, , Disp: , Rfl: 0    Diclofenac Sodium (VOLTAREN) 1 % gel gel, APPLY TOPICALLY TO HANDS TWICE DAILY, Disp: , Rfl:     hydroxychloroquine (PLAQUENIL) 200 MG tablet, , Disp: , Rfl:     ibuprofen (ADVIL,MOTRIN) 400 MG tablet, , Disp: , Rfl: 0    lisinopril (PRINIVIL,ZESTRIL) 5 MG tablet, Take 1 tablet by mouth Daily., Disp: , Rfl: 0    metoprolol succinate XL (TOPROL-XL) 25 MG 24 hr tablet, Take 1 tablet by mouth Daily., Disp: , Rfl: 0    omeprazole (priLOSEC) 40 MG capsule, Take 1 capsule by mouth Daily., Disp: , Rfl: 0    predniSONE (DELTASONE) 5 MG tablet, , Disp: , Rfl: 0    rosuvastatin (CRESTOR) 20 MG tablet, Take 1 tablet by mouth Daily., Disp: , Rfl:     bisacodyl (Dulcolax) 5 MG EC tablet, Take 2 at 3pm  "and 2 at 7pm day prior to colonoscopy. (Patient not taking: Reported on 12/7/2023), Disp: 4 tablet, Rfl: 0    diazePAM (VALIUM) 10 MG tablet, take 1 tablet by mouth every 6 hours if needed for BACK PAIN. MAY TAKE 1/2 OR 1 TABLET AS NEEDED (Patient not taking: Reported on 12/7/2023), Disp: , Rfl: 0    doxycycline (MONODOX) 100 MG capsule, Take 1 capsule by mouth 2 (Two) Times a Day., Disp: 20 capsule, Rfl: 0    finasteride (PROSCAR) 5 MG tablet, , Disp: , Rfl: 0    HYDROcodone-acetaminophen (NORCO)  MG per tablet, take 1 to 2 tablets by mouth 30 MINUTES prior to procedure then as directed THEREAFTER (Patient not taking: Reported on 12/7/2023), Disp: , Rfl: 0    polyethylene glycol (GlycoLax) 17 GM/SCOOP powder, Mix 238 g with 64  Oz clear liquid. Starting at 5 PM drinking 8 oz every 10 to 15 min until consumed (Patient not taking: Reported on 12/7/2023), Disp: 238 g, Rfl: 0    OBJECTIVE:    Vital Signs:   Vitals:    12/07/23 0958   BP: 140/84   Pulse: 64   Resp: 16   Temp: 97 °F (36.1 °C)   TempSrc: Temporal   SpO2: 96%   Weight: 91.6 kg (202 lb)   Height: 170.2 cm (67\")       Physical Exam:   General Appearance:    Alert, cooperative, in no acute distress   Head:    Normocephalic, without obvious abnormality, atraumatic   Eyes:            Lids and lashes normal, conjunctivae and sclerae normal, no   icterus, no pallor, corneas clear, PERRL   Ears:    Ears appear intact with no abnormalities noted   Throat:   No oral lesions, no thrush, oral mucosa moist   Neck:   No adenopathy, supple, trachea midline, no thyromegaly,  no JVD   Lungs:     Clear to auscultation,respirations regular, even and                  unlabored    Heart:    Regular rhythm and normal rate, normal S1 and S2, no            murmur   Abdomen:     no masses, no organomegaly, soft non-tender, non-distended, no guarding, there is no evidence of tenderness.  Patient tender in the right chest region as well.  No abdominal hernias or masses. "   Extremities:   Moves all extremities well, no edema, no cyanosis, no             redness   Pulses:   Pulses palpable and equal bilaterally   Skin:   No bleeding, bruising or rash   Lymph nodes:   No palpable adenopathy   Neurologic:   Cranial nerves 2 - 12 grossly intact, sensation intact      Results Review:   I reviewed the patient's new clinical results.    Review of Systems was reviewed and confirmed as accurate as documented by the MA.    ASSESSMENT/PLAN:    1. Gastroesophageal reflux disease, unspecified whether esophagitis present    2. Screening for colon cancer    3. Epigastric pain        I recommend a colonoscopy and EGD for further evaluation. The procedure was explained as well as the risks which include but are not limited to bleeding, infection, perforation, abdominal pain etc. The patient understands these risks and the procedure and wishes to proceed.  Continue PPI medication, avoid caffeine alcohol and nicotine.    Electronically signed by Ariel Quintanilla MD  12/07/23 13:09 EST

## 2023-12-07 ENCOUNTER — OFFICE VISIT (OUTPATIENT)
Dept: SURGERY | Facility: CLINIC | Age: 58
End: 2023-12-07
Payer: COMMERCIAL

## 2023-12-07 VITALS
RESPIRATION RATE: 16 BRPM | DIASTOLIC BLOOD PRESSURE: 84 MMHG | WEIGHT: 202 LBS | HEIGHT: 67 IN | OXYGEN SATURATION: 96 % | TEMPERATURE: 97 F | BODY MASS INDEX: 31.71 KG/M2 | HEART RATE: 64 BPM | SYSTOLIC BLOOD PRESSURE: 140 MMHG

## 2023-12-07 DIAGNOSIS — R10.13 EPIGASTRIC PAIN: ICD-10-CM

## 2023-12-07 DIAGNOSIS — Z12.11 SCREENING FOR COLON CANCER: ICD-10-CM

## 2023-12-07 DIAGNOSIS — K21.9 GASTROESOPHAGEAL REFLUX DISEASE, UNSPECIFIED WHETHER ESOPHAGITIS PRESENT: Primary | ICD-10-CM

## 2023-12-07 PROCEDURE — 1160F RVW MEDS BY RX/DR IN RCRD: CPT | Performed by: SURGERY

## 2023-12-07 PROCEDURE — 99203 OFFICE O/P NEW LOW 30 MIN: CPT | Performed by: SURGERY

## 2023-12-07 PROCEDURE — 1159F MED LIST DOCD IN RCRD: CPT | Performed by: SURGERY

## 2023-12-07 RX ORDER — ROSUVASTATIN CALCIUM 20 MG/1
1 TABLET, COATED ORAL DAILY
COMMUNITY
Start: 2023-11-21

## 2023-12-07 RX ORDER — POLYETHYLENE GLYCOL 3350 17 G/17G
POWDER, FOR SOLUTION ORAL
Qty: 238 G | Refills: 0 | Status: SHIPPED | OUTPATIENT
Start: 2023-12-07

## 2023-12-07 RX ORDER — BISACODYL 5 MG/1
TABLET, DELAYED RELEASE ORAL
Qty: 4 TABLET | Refills: 0 | Status: SHIPPED | OUTPATIENT
Start: 2023-12-07

## 2023-12-07 RX ORDER — HYDROXYCHLOROQUINE SULFATE 200 MG/1
TABLET, FILM COATED ORAL
COMMUNITY
Start: 2023-12-06

## 2023-12-07 RX ORDER — BACLOFEN 10 MG/1
1 TABLET ORAL 3 TIMES DAILY
COMMUNITY
Start: 2023-11-13

## 2024-01-08 ENCOUNTER — HOSPITAL ENCOUNTER (OUTPATIENT)
Facility: HOSPITAL | Age: 59
Discharge: HOME | End: 2024-01-08
Payer: MEDICAID

## 2024-01-08 ENCOUNTER — HOSPITAL ENCOUNTER (OUTPATIENT)
Dept: GENERAL RADIOLOGY | Facility: HOSPITAL | Age: 59
Discharge: HOME | End: 2024-01-08
Payer: MEDICAID

## 2024-01-08 DIAGNOSIS — E29.1 HYPOGONADISM, MALE: ICD-10-CM

## 2024-01-08 LAB
ALBUMIN SERPL-MCNC: 4.5 G/DL (ref 3.4–4.8)
ALBUMIN/GLOB SERPL: 2 {RATIO} (ref 0.8–2)
ALP SERPL-CCNC: 73 U/L (ref 25–100)
ALT SERPL-CCNC: 23 U/L (ref 4–36)
ANION GAP SERPL CALCULATED.3IONS-SCNC: 12 MMOL/L (ref 3–16)
AST SERPL-CCNC: 19 U/L (ref 8–33)
BACTERIA URNS QL MICRO: ABNORMAL /HPF
BILIRUB SERPL-MCNC: 0.3 MG/DL (ref 0.3–1.2)
BILIRUB UR QL STRIP.AUTO: NEGATIVE
BUN SERPL-MCNC: 12 MG/DL (ref 6–20)
CALCIUM SERPL-MCNC: 9.4 MG/DL (ref 8.5–10.5)
CHLORIDE SERPL-SCNC: 100 MMOL/L (ref 98–107)
CLARITY UR: CLEAR
CO2 SERPL-SCNC: 25 MMOL/L (ref 20–30)
COLOR UR: YELLOW
CREAT SERPL-MCNC: 0.8 MG/DL (ref 0.4–1.2)
EPI CELLS #/AREA URNS HPF: ABNORMAL /HPF (ref 0–5)
GFR SERPLBLD CREATININE-BSD FMLA CKD-EPI: >60 ML/MIN/{1.73_M2}
GLOBULIN SER CALC-MCNC: 2.2 G/DL
GLUCOSE SERPL-MCNC: 110 MG/DL (ref 74–106)
GLUCOSE UR STRIP.AUTO-MCNC: NEGATIVE MG/DL
HGB UR QL STRIP.AUTO: NEGATIVE
KETONES UR STRIP.AUTO-MCNC: NEGATIVE MG/DL
LEUKOCYTE ESTERASE UR QL STRIP.AUTO: NEGATIVE
MUCOUS THREADS URNS QL MICRO: ABNORMAL /LPF
NITRITE UR QL STRIP.AUTO: NEGATIVE
PH UR STRIP.AUTO: 6 [PH] (ref 5–8)
POTASSIUM SERPL-SCNC: 3.9 MMOL/L (ref 3.4–5.1)
PROT SERPL-MCNC: 6.7 G/DL (ref 6.4–8.3)
PROT UR STRIP.AUTO-MCNC: 30 MG/DL
PSA SERPL DL<=0.01 NG/ML-MCNC: 0.66 NG/ML (ref 0–4)
RBC #/AREA URNS HPF: ABNORMAL /HPF (ref 0–4)
SODIUM SERPL-SCNC: 137 MMOL/L (ref 136–145)
SP GR UR STRIP.AUTO: >=1.03 (ref 1–1.03)
SPERM, URINE: ABNORMAL /HPF
UA DIPSTICK W REFLEX MICRO PNL UR: YES
URN SPEC COLLECT METH UR: ABNORMAL
UROBILINOGEN UR STRIP-ACNC: 0.2 E.U./DL
WBC #/AREA URNS HPF: ABNORMAL /HPF (ref 0–5)

## 2024-01-08 PROCEDURE — 81001 URINALYSIS AUTO W/SCOPE: CPT

## 2024-01-08 PROCEDURE — 84153 ASSAY OF PSA TOTAL: CPT

## 2024-01-08 PROCEDURE — 36415 COLL VENOUS BLD VENIPUNCTURE: CPT

## 2024-01-08 PROCEDURE — 87086 URINE CULTURE/COLONY COUNT: CPT

## 2024-01-08 PROCEDURE — 80053 COMPREHEN METABOLIC PANEL: CPT

## 2024-01-08 PROCEDURE — 74018 RADEX ABDOMEN 1 VIEW: CPT

## 2024-01-09 LAB — BACTERIA UR CULT: NORMAL

## 2024-01-11 ENCOUNTER — TELEPHONE (OUTPATIENT)
Dept: SURGERY | Facility: CLINIC | Age: 59
End: 2024-01-11
Payer: COMMERCIAL

## 2024-01-16 ENCOUNTER — OUTSIDE FACILITY SERVICE (OUTPATIENT)
Dept: SURGERY | Facility: CLINIC | Age: 59
End: 2024-01-16
Payer: COMMERCIAL

## 2024-01-17 ENCOUNTER — TELEPHONE (OUTPATIENT)
Dept: SURGERY | Facility: CLINIC | Age: 59
End: 2024-01-17
Payer: COMMERCIAL

## 2024-01-19 NOTE — TELEPHONE ENCOUNTER
Caller: Buzz Cox    Relationship to patient: Self    Best call back number: [phone]    Patient is needing: PT CALLED BACK TO SCHEDULE

## 2024-02-01 ENCOUNTER — TELEPHONE (OUTPATIENT)
Dept: SURGERY | Facility: CLINIC | Age: 59
End: 2024-02-01
Payer: COMMERCIAL

## 2024-02-06 ENCOUNTER — OUTSIDE FACILITY SERVICE (OUTPATIENT)
Dept: SURGERY | Facility: CLINIC | Age: 59
End: 2024-02-06
Payer: COMMERCIAL

## 2024-02-22 ENCOUNTER — OFFICE VISIT (OUTPATIENT)
Dept: SURGERY | Facility: CLINIC | Age: 59
End: 2024-02-22
Payer: COMMERCIAL

## 2024-02-22 VITALS
DIASTOLIC BLOOD PRESSURE: 71 MMHG | RESPIRATION RATE: 18 BRPM | HEIGHT: 67 IN | TEMPERATURE: 97.8 F | BODY MASS INDEX: 31.71 KG/M2 | HEART RATE: 63 BPM | WEIGHT: 202 LBS | OXYGEN SATURATION: 96 % | SYSTOLIC BLOOD PRESSURE: 136 MMHG

## 2024-02-22 DIAGNOSIS — K21.00 GASTROESOPHAGEAL REFLUX DISEASE WITH ESOPHAGITIS WITHOUT HEMORRHAGE: ICD-10-CM

## 2024-02-22 DIAGNOSIS — R10.11 RIGHT UPPER QUADRANT ABDOMINAL PAIN: Primary | ICD-10-CM

## 2024-02-22 PROCEDURE — 1160F RVW MEDS BY RX/DR IN RCRD: CPT | Performed by: SURGERY

## 2024-02-22 PROCEDURE — 99213 OFFICE O/P EST LOW 20 MIN: CPT | Performed by: SURGERY

## 2024-02-22 PROCEDURE — 1159F MED LIST DOCD IN RCRD: CPT | Performed by: SURGERY

## 2024-02-22 RX ORDER — LISINOPRIL 2.5 MG/1
TABLET ORAL
COMMUNITY

## 2024-02-22 RX ORDER — OMEPRAZOLE 40 MG/1
1 CAPSULE, DELAYED RELEASE ORAL DAILY
COMMUNITY

## 2024-02-26 ENCOUNTER — HOSPITAL ENCOUNTER (OUTPATIENT)
Dept: ULTRASOUND IMAGING | Facility: HOSPITAL | Age: 59
Discharge: HOME | End: 2024-02-26
Payer: MEDICAID

## 2024-02-26 DIAGNOSIS — R10.11 RUQ PAIN: ICD-10-CM

## 2024-02-26 PROCEDURE — 76705 ECHO EXAM OF ABDOMEN: CPT

## 2024-02-27 NOTE — PROGRESS NOTES
Patient: Buzz Cox  YOB: 1965    Date: 02/29/2024    Primary Care Provider: Beata Lou APRN    Chief Complaint   Patient presents with    Follow-up     Gallbladder ultrasound       History: The patient is in the office today for a follow up on RUQ abdominal pain.  He had an EGD and was placed on a different PPI medication.  Ultrasound of the gallbladder was unremarkable.  He states his abdominal pain is a little better after reducing a couple of medications.  Patient feels that Pepcid works better than his PPI medication.  Still having significant epigastric pain with burning is burning in nature.  Not relieved by PPI medication.    The following portions of the patient's history were reviewed and updated as appropriate: allergies, current medications, past family history, past medical history, past social history, past surgical history and problem list.    Review of Systems   Constitutional:  Negative for chills, fever and unexpected weight change.   HENT:  Negative for trouble swallowing and voice change.    Eyes:  Negative for visual disturbance.   Respiratory:  Negative for apnea, cough, chest tightness, shortness of breath and wheezing.    Cardiovascular:  Negative for chest pain, palpitations and leg swelling.   Gastrointestinal:  Positive for abdominal pain. Negative for abdominal distention, anal bleeding, blood in stool, constipation, diarrhea, nausea, rectal pain and vomiting.   Endocrine: Negative for cold intolerance and heat intolerance.   Genitourinary:  Negative for difficulty urinating, dysuria, flank pain, scrotal swelling and testicular pain.   Musculoskeletal:  Negative for back pain, gait problem and joint swelling.   Skin:  Negative for color change, rash and wound.   Neurological:  Negative for dizziness, syncope, speech difficulty, weakness, numbness and headaches.   Hematological:  Negative for adenopathy. Does not bruise/bleed easily.   Psychiatric/Behavioral:   "Negative for confusion. The patient is not nervous/anxious.        Vital Signs  Vitals:    02/29/24 0948   BP: 147/74   Pulse: 59   Temp: 97.5 °F (36.4 °C)   TempSrc: Temporal   SpO2: 97%   Weight: 91.6 kg (201 lb 15.1 oz)   Height: 170.2 cm (67.01\")       Allergies:  Allergies   Allergen Reactions    Adhesive Tape Unknown - Low Severity       Medications:    Current Outpatient Medications:     aspirin 81 MG EC tablet, Take 1 tablet by mouth Daily., Disp: , Rfl:     atorvastatin (LIPITOR) 20 MG tablet, Take 1 tablet by mouth Daily., Disp: , Rfl:     baclofen (LIORESAL) 10 MG tablet, Take 1 tablet by mouth 3 times a day., Disp: , Rfl:     bisacodyl (Dulcolax) 5 MG EC tablet, Take 2 at 3pm and 2 at 7pm day prior to colonoscopy. (Patient not taking: Reported on 2/22/2024), Disp: 4 tablet, Rfl: 0    bisacodyl 5 MG EC tablet, Use as directed. (Patient not taking: Reported on 2/22/2024), Disp: 4 tablet, Rfl: 0    colchicine 0.6 MG tablet, , Disp: , Rfl: 0    diazePAM (VALIUM) 10 MG tablet, , Disp: , Rfl: 0    Diclofenac Sodium (VOLTAREN) 1 % gel gel, APPLY TOPICALLY TO HANDS TWICE DAILY, Disp: , Rfl:     doxycycline (MONODOX) 100 MG capsule, Take 1 capsule by mouth 2 (Two) Times a Day. (Patient not taking: Reported on 2/22/2024), Disp: 20 capsule, Rfl: 0    finasteride (PROSCAR) 5 MG tablet, , Disp: , Rfl: 0    HYDROcodone-acetaminophen (NORCO)  MG per tablet, , Disp: , Rfl: 0    hydroxychloroquine (PLAQUENIL) 200 MG tablet, , Disp: , Rfl:     ibuprofen (ADVIL,MOTRIN) 400 MG tablet, , Disp: , Rfl: 0    lisinopril (PRINIVIL,ZESTRIL) 2.5 MG tablet, , Disp: , Rfl:     metoprolol succinate XL (TOPROL-XL) 25 MG 24 hr tablet, Take 1 tablet by mouth Daily., Disp: , Rfl: 0    omeprazole (priLOSEC) 40 MG capsule, Take 1 capsule by mouth Daily., Disp: , Rfl:     polyethylene glycol (GlycoLax) 17 GM/SCOOP powder, Mix 238 g with 64  Oz clear liquid. Starting at 5 PM drinking 8 oz every 10 to 15 min until consumed (Patient not " taking: Reported on 2/22/2024), Disp: 238 g, Rfl: 0    polyethylene glycol (MIRALAX) 17 GM/SCOOP powder, Mix 238g powder with 64 oz of clear liquid. Use as directed. (Patient not taking: Reported on 2/22/2024), Disp: 238 g, Rfl: 0    predniSONE (DELTASONE) 5 MG tablet, , Disp: , Rfl: 0    rosuvastatin (CRESTOR) 20 MG tablet, Take 1 tablet by mouth Daily., Disp: , Rfl:     Physical Exam:   General Appearance:    Alert, cooperative, in no acute distress   Head:    Normocephalic, without obvious abnormality, atraumatic   Lungs:     Clear to auscultation,respirations regular, even and                  unlabored    Heart:    Regular rhythm and normal rate, normal S1 and S2, no            murmur, no gallop, no rub, no click   Abdomen:     Normal bowel sounds, no masses, no organomegaly, soft        non-tender, non-distended, tender in epigastric region.  No abdominal wall hernias or masses.   Extremities:   Moves all extremities well, no edema, no cyanosis, no             redness   Pulses:   Pulses palpable and equal bilaterally   Skin:   No bleeding, bruising or rash     Results Review:   I reviewed the patient's new clinical results.     Review of Systems was reviewed and confirmed as accurate as documented by the MA.    ASSESSMENT/PLAN:    1. Epigastric pain    2. Gastroesophageal reflux disease with esophagitis without hemorrhage       Patient placed on Pepcid 40 mg daily, avoid caffeine alcohol and nicotine and follow-up in 5 years repeat EGD and colonoscopy.    Electronically signed by Ariel Quintanilla MD  02/29/24

## 2024-02-29 ENCOUNTER — OFFICE VISIT (OUTPATIENT)
Dept: SURGERY | Facility: CLINIC | Age: 59
End: 2024-02-29
Payer: COMMERCIAL

## 2024-02-29 VITALS
BODY MASS INDEX: 31.7 KG/M2 | OXYGEN SATURATION: 97 % | WEIGHT: 201.94 LBS | TEMPERATURE: 97.5 F | HEIGHT: 67 IN | DIASTOLIC BLOOD PRESSURE: 74 MMHG | HEART RATE: 59 BPM | SYSTOLIC BLOOD PRESSURE: 147 MMHG

## 2024-02-29 DIAGNOSIS — K21.00 GASTROESOPHAGEAL REFLUX DISEASE WITH ESOPHAGITIS WITHOUT HEMORRHAGE: ICD-10-CM

## 2024-02-29 DIAGNOSIS — R10.13 EPIGASTRIC PAIN: Primary | ICD-10-CM

## 2024-02-29 PROCEDURE — 1160F RVW MEDS BY RX/DR IN RCRD: CPT | Performed by: SURGERY

## 2024-02-29 PROCEDURE — 1159F MED LIST DOCD IN RCRD: CPT | Performed by: SURGERY

## 2024-02-29 PROCEDURE — 99213 OFFICE O/P EST LOW 20 MIN: CPT | Performed by: SURGERY

## 2024-02-29 RX ORDER — FAMOTIDINE 40 MG/1
40 TABLET, FILM COATED ORAL EVERY EVENING
Qty: 30 TABLET | Refills: 11 | Status: SHIPPED | OUTPATIENT
Start: 2024-02-29 | End: 2025-02-28

## 2024-04-02 ENCOUNTER — HOSPITAL ENCOUNTER (OUTPATIENT)
Dept: GENERAL RADIOLOGY | Facility: HOSPITAL | Age: 59
Discharge: HOME | End: 2024-04-02
Payer: MEDICAID

## 2024-04-02 ENCOUNTER — HOSPITAL ENCOUNTER (OUTPATIENT)
Facility: HOSPITAL | Age: 59
Discharge: HOME | End: 2024-04-02
Payer: MEDICAID

## 2024-04-02 DIAGNOSIS — R31.1 BENIGN MICROSCOPIC HEMATURIA: ICD-10-CM

## 2024-04-02 LAB
ALBUMIN SERPL-MCNC: 4.8 G/DL (ref 3.4–4.8)
ALBUMIN/GLOB SERPL: 2.3 {RATIO} (ref 0.8–2)
ALP SERPL-CCNC: 74 U/L (ref 25–100)
ALT SERPL-CCNC: 20 U/L (ref 4–36)
ANION GAP SERPL CALCULATED.3IONS-SCNC: 9 MMOL/L (ref 3–16)
AST SERPL-CCNC: 19 U/L (ref 8–33)
BACTERIA URNS QL MICRO: ABNORMAL /HPF
BILIRUB SERPL-MCNC: 0.4 MG/DL (ref 0.3–1.2)
BILIRUB UR QL STRIP.AUTO: NEGATIVE
BUN SERPL-MCNC: 20 MG/DL (ref 6–20)
CALCIUM SERPL-MCNC: 9 MG/DL (ref 8.5–10.5)
CHLORIDE SERPL-SCNC: 100 MMOL/L (ref 98–107)
CLARITY UR: CLEAR
CO2 SERPL-SCNC: 25 MMOL/L (ref 20–30)
COLOR UR: YELLOW
CREAT SERPL-MCNC: 0.8 MG/DL (ref 0.4–1.2)
GFR SERPLBLD CREATININE-BSD FMLA CKD-EPI: >90 ML/MIN/{1.73_M2}
GLOBULIN SER CALC-MCNC: 2.1 G/DL
GLUCOSE SERPL-MCNC: 102 MG/DL (ref 74–106)
GLUCOSE UR STRIP.AUTO-MCNC: NEGATIVE MG/DL
HGB UR QL STRIP.AUTO: NEGATIVE
HYALINE CASTS #/AREA URNS LPF: ABNORMAL /LPF (ref 0–2)
KETONES UR STRIP.AUTO-MCNC: NEGATIVE MG/DL
LEUKOCYTE ESTERASE UR QL STRIP.AUTO: NEGATIVE
MUCOUS THREADS URNS QL MICRO: ABNORMAL /LPF
NITRITE UR QL STRIP.AUTO: NEGATIVE
PH UR STRIP.AUTO: 5.5 [PH] (ref 5–8)
POTASSIUM SERPL-SCNC: 4.1 MMOL/L (ref 3.4–5.1)
PROT SERPL-MCNC: 6.9 G/DL (ref 6.4–8.3)
PROT UR STRIP.AUTO-MCNC: ABNORMAL MG/DL
PSA SERPL DL<=0.01 NG/ML-MCNC: 0.39 NG/ML (ref 0–4)
RBC #/AREA URNS HPF: ABNORMAL /HPF (ref 0–4)
SODIUM SERPL-SCNC: 134 MMOL/L (ref 136–145)
SP GR UR STRIP.AUTO: >=1.03 (ref 1–1.03)
UA DIPSTICK W REFLEX MICRO PNL UR: YES
URN SPEC COLLECT METH UR: ABNORMAL
UROBILINOGEN UR STRIP-ACNC: 0.2 E.U./DL
WBC #/AREA URNS HPF: ABNORMAL /HPF (ref 0–5)

## 2024-04-02 PROCEDURE — 74018 RADEX ABDOMEN 1 VIEW: CPT

## 2024-04-02 PROCEDURE — 36415 COLL VENOUS BLD VENIPUNCTURE: CPT

## 2024-04-02 PROCEDURE — 81001 URINALYSIS AUTO W/SCOPE: CPT

## 2024-04-02 PROCEDURE — 80053 COMPREHEN METABOLIC PANEL: CPT

## 2024-04-02 PROCEDURE — 87086 URINE CULTURE/COLONY COUNT: CPT

## 2024-04-02 PROCEDURE — 84153 ASSAY OF PSA TOTAL: CPT

## 2024-04-03 LAB — BACTERIA UR CULT: NORMAL

## 2024-04-26 ENCOUNTER — HOSPITAL ENCOUNTER (OUTPATIENT)
Dept: GENERAL RADIOLOGY | Facility: HOSPITAL | Age: 59
Discharge: HOME | End: 2024-04-26
Payer: MEDICAID

## 2024-04-26 ENCOUNTER — HOSPITAL ENCOUNTER (OUTPATIENT)
Facility: HOSPITAL | Age: 59
Discharge: HOME | End: 2024-04-26
Payer: MEDICAID

## 2024-04-26 DIAGNOSIS — R97.20 ELEVATED PROSTATE SPECIFIC ANTIGEN (PSA): ICD-10-CM

## 2024-04-26 DIAGNOSIS — M54.50 LOW BACK PAIN, UNSPECIFIED BACK PAIN LATERALITY, UNSPECIFIED CHRONICITY, UNSPECIFIED WHETHER SCIATICA PRESENT: ICD-10-CM

## 2024-04-26 DIAGNOSIS — N30.20 BLADDER INFECTION, CHRONIC: ICD-10-CM

## 2024-04-26 DIAGNOSIS — N40.1 ENLARGED PROSTATE WITH URINARY OBSTRUCTION: ICD-10-CM

## 2024-04-26 DIAGNOSIS — N13.8 ENLARGED PROSTATE WITH URINARY OBSTRUCTION: ICD-10-CM

## 2024-04-26 LAB
ALBUMIN SERPL-MCNC: 4.9 G/DL (ref 3.4–4.8)
ALBUMIN/GLOB SERPL: 2.6 {RATIO} (ref 0.8–2)
ALP SERPL-CCNC: 67 U/L (ref 25–100)
ALT SERPL-CCNC: 22 U/L (ref 4–36)
ANION GAP SERPL CALCULATED.3IONS-SCNC: 12 MMOL/L (ref 3–16)
AST SERPL-CCNC: 23 U/L (ref 8–33)
BACTERIA URNS QL MICRO: ABNORMAL /HPF
BILIRUB SERPL-MCNC: 0.4 MG/DL (ref 0.3–1.2)
BILIRUB UR QL STRIP.AUTO: ABNORMAL
BUN SERPL-MCNC: 19 MG/DL (ref 6–20)
CALCIUM SERPL-MCNC: 9.4 MG/DL (ref 8.5–10.5)
CHLORIDE SERPL-SCNC: 103 MMOL/L (ref 98–107)
CLARITY UR: CLEAR
CO2 SERPL-SCNC: 26 MMOL/L (ref 20–30)
COLOR UR: YELLOW
CREAT SERPL-MCNC: 1.1 MG/DL (ref 0.4–1.2)
EPI CELLS #/AREA URNS HPF: ABNORMAL /HPF (ref 0–5)
GFR SERPLBLD CREATININE-BSD FMLA CKD-EPI: 77 ML/MIN/{1.73_M2}
GLOBULIN SER CALC-MCNC: 1.9 G/DL
GLUCOSE SERPL-MCNC: 97 MG/DL (ref 74–106)
GLUCOSE UR STRIP.AUTO-MCNC: NEGATIVE MG/DL
HGB UR QL STRIP.AUTO: NEGATIVE
HYALINE CASTS #/AREA URNS LPF: ABNORMAL /LPF (ref 0–2)
KETONES UR STRIP.AUTO-MCNC: NEGATIVE MG/DL
LEUKOCYTE ESTERASE UR QL STRIP.AUTO: NEGATIVE
NITRITE UR QL STRIP.AUTO: NEGATIVE
PH UR STRIP.AUTO: 5.5 [PH] (ref 5–8)
POTASSIUM SERPL-SCNC: 4.5 MMOL/L (ref 3.4–5.1)
PROT SERPL-MCNC: 6.8 G/DL (ref 6.4–8.3)
PROT UR STRIP.AUTO-MCNC: ABNORMAL MG/DL
PSA SERPL DL<=0.01 NG/ML-MCNC: 0.91 NG/ML (ref 0–4)
RBC #/AREA URNS HPF: ABNORMAL /HPF (ref 0–4)
SODIUM SERPL-SCNC: 141 MMOL/L (ref 136–145)
SP GR UR STRIP.AUTO: >=1.03 (ref 1–1.03)
UA DIPSTICK W REFLEX MICRO PNL UR: YES
URN SPEC COLLECT METH UR: ABNORMAL
UROBILINOGEN UR STRIP-ACNC: 0.2 E.U./DL
WBC #/AREA URNS HPF: ABNORMAL /HPF (ref 0–5)

## 2024-04-26 PROCEDURE — 87086 URINE CULTURE/COLONY COUNT: CPT

## 2024-04-26 PROCEDURE — 80053 COMPREHEN METABOLIC PANEL: CPT

## 2024-04-26 PROCEDURE — 81001 URINALYSIS AUTO W/SCOPE: CPT

## 2024-04-26 PROCEDURE — 74018 RADEX ABDOMEN 1 VIEW: CPT

## 2024-04-26 PROCEDURE — 36415 COLL VENOUS BLD VENIPUNCTURE: CPT

## 2024-04-26 PROCEDURE — 84153 ASSAY OF PSA TOTAL: CPT

## 2024-04-27 LAB — BACTERIA UR CULT: NORMAL

## 2024-06-05 ENCOUNTER — HOSPITAL ENCOUNTER (OUTPATIENT)
Facility: HOSPITAL | Age: 59
Discharge: HOME | End: 2024-06-05
Payer: MEDICAID

## 2024-06-05 LAB
ALBUMIN SERPL-MCNC: 4.8 G/DL (ref 3.4–4.8)
ALBUMIN/GLOB SERPL: 2.5 {RATIO} (ref 0.8–2)
ALP SERPL-CCNC: 63 U/L (ref 25–100)
ALT SERPL-CCNC: 27 U/L (ref 4–36)
ANION GAP SERPL CALCULATED.3IONS-SCNC: 12 MMOL/L (ref 3–16)
AST SERPL-CCNC: 25 U/L (ref 8–33)
BASOPHILS # BLD: 0.1 K/UL (ref 0–0.1)
BASOPHILS NFR BLD: 0.8 %
BILIRUB SERPL-MCNC: 0.4 MG/DL (ref 0.3–1.2)
BUN SERPL-MCNC: 18 MG/DL (ref 6–20)
CALCIUM SERPL-MCNC: 9.2 MG/DL (ref 8.5–10.5)
CHLORIDE SERPL-SCNC: 102 MMOL/L (ref 98–107)
CHOLEST SERPL-MCNC: 111 MG/DL (ref 0–200)
CO2 SERPL-SCNC: 24 MMOL/L (ref 20–30)
CREAT SERPL-MCNC: 0.8 MG/DL (ref 0.4–1.2)
EOSINOPHIL # BLD: 0.1 K/UL (ref 0–0.4)
EOSINOPHIL NFR BLD: 1.7 %
ERYTHROCYTE [DISTWIDTH] IN BLOOD BY AUTOMATED COUNT: 13.2 % (ref 11–16)
FOLATE SERPL-MCNC: 17.04 NG/ML
GFR SERPLBLD CREATININE-BSD FMLA CKD-EPI: >90 ML/MIN/{1.73_M2}
GLOBULIN SER CALC-MCNC: 1.9 G/DL
GLUCOSE SERPL-MCNC: 96 MG/DL (ref 74–106)
HBA1C MFR BLD: 5.8 %
HCT VFR BLD AUTO: 39.1 % (ref 40–54)
HDLC SERPL-MCNC: 40 MG/DL (ref 40–60)
HGB BLD-MCNC: 13.2 G/DL (ref 13–18)
IMM GRANULOCYTES # BLD: 0 K/UL
IMM GRANULOCYTES NFR BLD: 0.3 % (ref 0–5)
LDLC SERPL CALC-MCNC: 46 MG/DL
LYMPHOCYTES # BLD: 2.5 K/UL (ref 1.5–4)
LYMPHOCYTES NFR BLD: 32.2 %
MCH RBC QN AUTO: 30.8 PG (ref 27–32)
MCHC RBC AUTO-ENTMCNC: 33.8 G/DL (ref 31–35)
MCV RBC AUTO: 91.1 FL (ref 80–100)
MONOCYTES # BLD: 0.8 K/UL (ref 0.2–0.8)
MONOCYTES NFR BLD: 10.4 %
NEUTROPHILS # BLD: 4.3 K/UL (ref 2–7.5)
NEUTS SEG NFR BLD: 54.6 %
PLATELET # BLD AUTO: 247 K/UL (ref 150–400)
PMV BLD AUTO: 9.3 FL (ref 6–10)
POTASSIUM SERPL-SCNC: 4.3 MMOL/L (ref 3.4–5.1)
PROT SERPL-MCNC: 6.7 G/DL (ref 6.4–8.3)
RBC # BLD AUTO: 4.29 M/UL (ref 4.5–6)
SODIUM SERPL-SCNC: 138 MMOL/L (ref 136–145)
TRIGL SERPL-MCNC: 124 MG/DL (ref 0–249)
TSH SERPL DL<=0.005 MIU/L-ACNC: 2.95 UIU/ML (ref 0.27–4.2)
VIT B12 SERPL-MCNC: 442 PG/ML (ref 211–911)
VLDLC SERPL CALC-MCNC: 25 MG/DL
WBC # BLD AUTO: 7.8 K/UL (ref 4–11)

## 2024-06-05 PROCEDURE — 36415 COLL VENOUS BLD VENIPUNCTURE: CPT

## 2024-06-05 PROCEDURE — 83036 HEMOGLOBIN GLYCOSYLATED A1C: CPT

## 2024-06-05 PROCEDURE — 80053 COMPREHEN METABOLIC PANEL: CPT

## 2024-06-05 PROCEDURE — 84443 ASSAY THYROID STIM HORMONE: CPT

## 2024-06-05 PROCEDURE — 82607 VITAMIN B-12: CPT

## 2024-06-05 PROCEDURE — 85025 COMPLETE CBC W/AUTO DIFF WBC: CPT

## 2024-06-05 PROCEDURE — 80061 LIPID PANEL: CPT

## 2024-06-05 PROCEDURE — 82746 ASSAY OF FOLIC ACID SERUM: CPT

## 2024-06-17 NOTE — PROGRESS NOTES
Medication Therapy Management (MTM) Encounter    ASSESSMENT:                            Medication Adherence/Access: No issues identified    1. Type 2 diabetes mellitus with hyperglycemia, without long-term current use of insulin (H)  Patient is not meeting A1c goal of < 8%, plan to recheck next month with primary care provider.  Patient is meeting goal of > 50% time in target with continuous glucose monitoring; highest readings usually after breakfast.  Patient would benefit from increasing breakfast dose of Novolog to target post-prandial blood sugar.    2. Essential hypertension with goal blood pressure less than 140/90  BP at goal.    3. Cerebrovascular accident (CVA) due to stenosis of basilar artery (H)  Stable.    4. Constipation, unspecified constipation type  Stable.    5. Insomnia, unspecified type/Depression  Could try an increased dose of duloxetine to help with mood swings. Although doses >60 mg/day did not confer additional benefit in clinical trials, limited data support a dose escalation in individual patients who are demonstrating some but not sufficient improvement after ?4 weeks. Would prefer this at this time rather than adding additional medication.    6. Gastroesophageal reflux disease, unspecified whether esophagitis present  Stable, though could consider reducing to as needed H2 blocker in the future, rather than continuing as needed PPI.     7. Chronic gouty arthropathy  Stable.     8. Pain  Stable.     9. Itching  Stable.      PLAN:                            1.  Increase dose: Novolog 10 units before breakfast. Keep taking 6 units before lunch and dinner  2. Increase duloxetine to 1 capsule every morning and 2 capsules every evening to see if this helps over the next month with mood swings.    Follow-up: Return in 9 weeks (on 8/19/2024) for Medication Therapy Management, in person, at 10am.  PCP 7/26    SUBJECTIVE/OBJECTIVE:                          Adam Talamantes is a 81 year old male coming in  Patient: Buzz Cox    YOB: 1965    Date: 02/22/2024    Primary Care Provider: Beata Lou APRN    Reason for Consultation: Follow-up EGD/colonoscopy    Chief Complaint   Patient presents with    Follow-up     EGD       History of present illness:  I saw the patient in the office today as a followup from their recent EGD with biopsy, the pathology report did show mild chronic gastritis and reactive gastropathy.  They state that they have done well.  Patient complains of significant right upper quadrant pain, severe attacks usually after eating fish sandwich or spicy foods.  No significant change in bowel habits.  Patient on omeprazole with no relief.  Would like to try different medication.    The following portions of the patient's history were reviewed and updated as appropriate: allergies, current medications, past family history, past medical history, past social history, past surgical history and problem list.    Review of Systems   Constitutional:  Negative for chills, fever and unexpected weight change.   HENT:  Negative for trouble swallowing and voice change.    Eyes:  Negative for visual disturbance.   Respiratory:  Negative for apnea, cough, chest tightness, shortness of breath and wheezing.    Cardiovascular:  Negative for chest pain, palpitations and leg swelling.   Gastrointestinal:  Negative for abdominal distention, abdominal pain, anal bleeding, blood in stool, constipation, diarrhea, nausea, rectal pain and vomiting.   Endocrine: Negative for cold intolerance and heat intolerance.   Genitourinary:  Negative for difficulty urinating, dysuria, flank pain, scrotal swelling and testicular pain.   Musculoskeletal:  Negative for back pain, gait problem and joint swelling.   Skin:  Negative for color change, rash and wound.   Neurological:  Negative for dizziness, syncope, speech difficulty, weakness, numbness and headaches.   Hematological:  Negative for adenopathy. Does not  "bruise/bleed easily.   Psychiatric/Behavioral:  Negative for confusion. The patient is not nervous/anxious.        Vital Signs:   Vitals:    02/22/24 0857   BP: 136/71   Pulse: 63   Resp: 18   Temp: 97.8 °F (36.6 °C)   TempSrc: Temporal   SpO2: 96%   Weight: 91.6 kg (202 lb)   Height: 170.2 cm (67\")       Allergies:  Allergies   Allergen Reactions    Adhesive Tape Unknown - Low Severity       Medications:    Current Outpatient Medications:     aspirin 81 MG EC tablet, Take 1 tablet by mouth Daily., Disp: , Rfl:     atorvastatin (LIPITOR) 20 MG tablet, Take 1 tablet by mouth Daily., Disp: , Rfl:     baclofen (LIORESAL) 10 MG tablet, Take 1 tablet by mouth 3 times a day., Disp: , Rfl:     colchicine 0.6 MG tablet, , Disp: , Rfl: 0    diazePAM (VALIUM) 10 MG tablet, , Disp: , Rfl: 0    Diclofenac Sodium (VOLTAREN) 1 % gel gel, APPLY TOPICALLY TO HANDS TWICE DAILY, Disp: , Rfl:     finasteride (PROSCAR) 5 MG tablet, , Disp: , Rfl: 0    HYDROcodone-acetaminophen (NORCO)  MG per tablet, , Disp: , Rfl: 0    hydroxychloroquine (PLAQUENIL) 200 MG tablet, , Disp: , Rfl:     ibuprofen (ADVIL,MOTRIN) 400 MG tablet, , Disp: , Rfl: 0    metoprolol succinate XL (TOPROL-XL) 25 MG 24 hr tablet, Take 1 tablet by mouth Daily., Disp: , Rfl: 0    predniSONE (DELTASONE) 5 MG tablet, , Disp: , Rfl: 0    rosuvastatin (CRESTOR) 20 MG tablet, Take 1 tablet by mouth Daily., Disp: , Rfl:     bisacodyl (Dulcolax) 5 MG EC tablet, Take 2 at 3pm and 2 at 7pm day prior to colonoscopy. (Patient not taking: Reported on 2/22/2024), Disp: 4 tablet, Rfl: 0    bisacodyl 5 MG EC tablet, Use as directed. (Patient not taking: Reported on 2/22/2024), Disp: 4 tablet, Rfl: 0    doxycycline (MONODOX) 100 MG capsule, Take 1 capsule by mouth 2 (Two) Times a Day. (Patient not taking: Reported on 2/22/2024), Disp: 20 capsule, Rfl: 0    lisinopril (PRINIVIL,ZESTRIL) 2.5 MG tablet, , Disp: , Rfl:     omeprazole (priLOSEC) 40 MG capsule, Take 1 capsule by mouth " for a follow-up visit. Patient was accompanied by daughter.       Reason for visit: MTM follow up.     Allergies/ADRs: Reviewed in chart  Past Medical History: Reviewed in chart  Tobacco: He reports that he quit smoking about 24 years ago. His smoking use included cigarettes. He has never been exposed to tobacco smoke. He has quit using smokeless tobacco.  Alcohol: None    Medication Adherence/Access: His daughter, Dimitri Talamantes, helps him with medications, set up in twice daily pillbox.  Reports no missed medication doses.   Brings with medication vials today, AM And PM pillboxes which are set up appropriately, and meter.     Diabetes   Januvia 50 mg daily in AM  Lantus 30 units daily at noon - changed post hospitalization from QPM  Novolog 8 units with breakfast, 6units with lunch and dinner (8 am, noon, 5 pm)  Medication History:  Jardiance 10mg QAM - UTI, stopped inpatient    Patient is not experiencing side effects.  Daughter helps with insulin injections, patient unable to do this on his own, reports no missed doses.   Blood sugar monitoring: Continuous Glucose Monitoring with Ray 2; see below.   Current diabetes symptoms: No symptoms reported. Polyuria, but following with urology, now with catheter.  Diet/Exercise: Eating 3 very meals per day, never skipping meals.  Declining CDE at this time. Portion of food varies.   Morning: Ensure or glucocontrol, 2tbs white rice with soup or chicken or egg or bread with milk - breakfast is largest meal  Lunch: same or soup and rice  Dinner: fried rice with veggie (pea, carrot, broccoli, bok jonathan) with egg  Milk no more than twice per day     Eye exam is due.   Foot exam: due  Urine Albumin:   Lab Results   Component Value Date    UMALCR 427.55 (H) 05/02/2024      Lab Results   Component Value Date    A1C 8.8 (H) 02/26/2024             Hypertension   Metoprolol succinate 100 mg - 1/2 tablet (50 mg) daily  Meclizine 12.5 mg 3 times daily as needed - using about 3 times  Daily., Disp: , Rfl:     polyethylene glycol (GlycoLax) 17 GM/SCOOP powder, Mix 238 g with 64  Oz clear liquid. Starting at 5 PM drinking 8 oz every 10 to 15 min until consumed (Patient not taking: Reported on 2/22/2024), Disp: 238 g, Rfl: 0    polyethylene glycol (MIRALAX) 17 GM/SCOOP powder, Mix 238g powder with 64 oz of clear liquid. Use as directed. (Patient not taking: Reported on 2/22/2024), Disp: 238 g, Rfl: 0    Physical Exam:   General Appearance:    Alert, cooperative, in no acute distress   Abdomen:     no masses, no organomegaly, soft non-tender, non-distended, no guarding, moderate epigastric and right upper quadrant tenderness.  No abdominal hernias or masses.   Chest:      Clear toausculation            Cor:  Regular rate and rhythm      Results Review:   I reviewed the patient's new clinical results.    Review of Systems was reviewed and confirmed as accurate as documented by the MA.    Assessment / Plan:    1. Right upper quadrant abdominal pain    2. Gastroesophageal reflux disease with esophagitis without hemorrhage        I did discuss the situation with the patient today in the office and they have done well from their recent EGD with biopsy. I have told the patient we will place him on different PPI medication, I will schedule gallbladder ultrasound follow-up in 1 week.  Maintain a low-fat diet and avoid caffeine alcohol and nicotine.  Repeat colonoscopy in 5 years..    Electronically signed by Ariel Quintanilla MD  02/22/24                   weekly, also drinking more fluid when feeling this way  Patient reports no current medication side effects  Patient does not self-monitor blood pressure sometimes, reports      BP Readings from Last 3 Encounters:   06/17/24 136/84   06/10/24 123/81   06/02/24 (!) 152/99     Pulse Readings from Last 3 Encounters:   06/17/24 88   06/10/24 89   06/02/24 100     Hyperlipidemia /CVA  Rosuvastatin 20 mg daily  Eliquis 5 mg twice daily  Nuedexta (dextromethorphan/quinidine) 20/10 mg twice daily (per neurology)  Patient reports no significant myalgias or other side effects.  The ASCVD Risk score (Cat DK, et al., 2019) failed to calculate for the following reasons:    The 2019 ASCVD risk score is only valid for ages 40 to 79    The patient has a prior MI or stroke diagnosis     Recent Labs   Lab Test 05/20/24  1210 05/12/24  0421 02/07/23  0627   CHOL 122  --  151   HDL 29*  --  40   LDL 45  --  61   TRIG 238* 190* 252*     Constipation:   Senna/Docusate sodium 8.6/100 mg once daily as needed (brings with full vial)  Miralax 17 g daily as needed - reports giving almost every day  Reports constipation has been well controlled with current therapy.      Insomnia/Depression:  Duloxetine 30 mg twice daily   Trazodone 50 mg 1-2 tab daily at bedtime   His sleep has been the same. Dose of trazodone increased within the last month, though sleep continues to vary, still sleeping during the day and sometimes as night, still having nighttime urination.   Daughter reports an increase in mood swings, possible memory issues -- he gets upset if he forgot he ate a meal and family told him he already ate and won't give him more food, for example. Wonders about increasing the dose of duloxetine?    GERD  as needed  Pantoprazole 40 mg mg once daily, as needed  Tums 500 mg mg once daily, as needed  Patient reports no current symptoms. No complaints of symptoms for a couple months.      Gout:   Allopurinol 200 mg daily   Uric Acid   Date  Value Ref Range Status   12/20/2018 6.8 3.0 - 8.0 mg/dL Final     Pain/mood:   Acetaminophen 500 mg as needed   Duloxetine 30 mg twice daily  Celebrex 200 mg daily as needed  Unclear how often patient taking tylenol, reports pain has been the same.     Itching:    Hydroxyzine  25 mg every 8 hours as needed at night time - only taking as needed, finds effective, no concerns.     Today's Vitals: /84   Pulse 88   ----------------  Post Discharge Medication Reconciliation Status: discharge medications reconciled and changed, per note/orders.      I spent 45 minutes with this patient today. All changes were made via collaborative practice agreement with Chucho Espino MD. A copy of the visit note was provided to the patient's provider(s).    A summary of these recommendations was given to the patient.    Kathy Salinas, Pharm.D., [facility]  Medication Therapy Management Pharmacist  [phone]      Medication Therapy Recommendations  Depression, unspecified depression type    Current Medication: DULoxetine (CYMBALTA) 30 MG capsule (Discontinued)   Rationale: Dose too low - Dosage too low - Effectiveness   Recommendation: Increase Dose   Status: Accepted per CPA         Type 2 diabetes mellitus with chronic kidney disease, with long-term current use of insulin, unspecified CKD stage (H)    Current Medication: insulin aspart (NOVOLOG FLEXPEN) 100 UNIT/ML pen (Discontinued)   Rationale: Dose too low - Dosage too low - Effectiveness   Recommendation: Increase Dose   Status: Accepted per CPA

## 2024-07-26 ENCOUNTER — HOSPITAL ENCOUNTER (OUTPATIENT)
Dept: GENERAL RADIOLOGY | Facility: HOSPITAL | Age: 59
Discharge: HOME | End: 2024-07-26
Payer: MEDICAID

## 2024-07-26 ENCOUNTER — HOSPITAL ENCOUNTER (OUTPATIENT)
Facility: HOSPITAL | Age: 59
Discharge: HOME | End: 2024-07-26
Payer: MEDICAID

## 2024-07-26 DIAGNOSIS — N13.8 ENLARGED PROSTATE WITH URINARY OBSTRUCTION: ICD-10-CM

## 2024-07-26 DIAGNOSIS — N40.1 ENLARGED PROSTATE WITH URINARY OBSTRUCTION: ICD-10-CM

## 2024-07-26 LAB
BILIRUB UR QL STRIP.AUTO: NEGATIVE
CLARITY UR: CLEAR
COLOR UR: YELLOW
GLUCOSE UR STRIP.AUTO-MCNC: NEGATIVE MG/DL
HGB UR QL STRIP.AUTO: NEGATIVE
KETONES UR STRIP.AUTO-MCNC: NEGATIVE MG/DL
LEUKOCYTE ESTERASE UR QL STRIP.AUTO: NEGATIVE
NITRITE UR QL STRIP.AUTO: NEGATIVE
PH UR STRIP.AUTO: 5.5 [PH] (ref 5–8)
PROT UR STRIP.AUTO-MCNC: NEGATIVE MG/DL
SP GR UR STRIP.AUTO: >=1.03 (ref 1–1.03)
UA DIPSTICK W REFLEX MICRO PNL UR: NORMAL
URN SPEC COLLECT METH UR: NORMAL
UROBILINOGEN UR STRIP-ACNC: 0.2 E.U./DL

## 2024-07-26 PROCEDURE — 74018 RADEX ABDOMEN 1 VIEW: CPT

## 2024-07-26 PROCEDURE — 81003 URINALYSIS AUTO W/O SCOPE: CPT

## 2024-07-26 PROCEDURE — 87086 URINE CULTURE/COLONY COUNT: CPT

## 2024-07-28 LAB — BACTERIA UR CULT: NORMAL

## 2024-09-12 ENCOUNTER — HOSPITAL ENCOUNTER (OUTPATIENT)
Dept: GENERAL RADIOLOGY | Facility: HOSPITAL | Age: 59
Discharge: HOME | End: 2024-09-12
Payer: MEDICAID

## 2024-09-12 ENCOUNTER — HOSPITAL ENCOUNTER (OUTPATIENT)
Facility: HOSPITAL | Age: 59
Discharge: HOME | End: 2024-09-12
Payer: MEDICAID

## 2024-09-12 DIAGNOSIS — M79.642 LEFT HAND PAIN: ICD-10-CM

## 2024-09-12 LAB
25(OH)D3 SERPL-MCNC: 46.2 NG/ML (ref 32–100)
ALBUMIN SERPL-MCNC: 4.7 G/DL (ref 3.4–4.8)
ALBUMIN/GLOB SERPL: 1.8 {RATIO} (ref 0.8–2)
ALP SERPL-CCNC: 75 U/L (ref 25–100)
ALT SERPL-CCNC: 20 U/L (ref 4–36)
ANION GAP SERPL CALCULATED.3IONS-SCNC: 10 MMOL/L (ref 3–16)
AST SERPL-CCNC: 22 U/L (ref 8–33)
BASOPHILS # BLD: 0.1 K/UL (ref 0–0.1)
BASOPHILS NFR BLD: 0.6 %
BILIRUB SERPL-MCNC: 0.4 MG/DL (ref 0.3–1.2)
BUN SERPL-MCNC: 17 MG/DL (ref 6–20)
CALCIUM SERPL-MCNC: 10.2 MG/DL (ref 8.5–10.5)
CHLORIDE SERPL-SCNC: 103 MMOL/L (ref 98–107)
CHOLEST SERPL-MCNC: 179 MG/DL (ref 0–200)
CO2 SERPL-SCNC: 27 MMOL/L (ref 20–30)
CREAT SERPL-MCNC: 0.9 MG/DL (ref 0.4–1.2)
EOSINOPHIL # BLD: 0.2 K/UL (ref 0–0.4)
EOSINOPHIL NFR BLD: 1.6 %
ERYTHROCYTE [DISTWIDTH] IN BLOOD BY AUTOMATED COUNT: 12.9 % (ref 11–16)
FOLATE SERPL-MCNC: >20 NG/ML
GFR SERPLBLD CREATININE-BSD FMLA CKD-EPI: >90 ML/MIN/{1.73_M2}
GLOBULIN SER CALC-MCNC: 2.6 G/DL
GLUCOSE SERPL-MCNC: 102 MG/DL (ref 74–106)
HBA1C MFR BLD: 5.5 %
HCT VFR BLD AUTO: 41.4 % (ref 40–54)
HDLC SERPL-MCNC: 49 MG/DL (ref 40–60)
HGB BLD-MCNC: 14 G/DL (ref 13–18)
IMM GRANULOCYTES # BLD: 0 K/UL
IMM GRANULOCYTES NFR BLD: 0.3 % (ref 0–5)
LDLC SERPL CALC-MCNC: 108 MG/DL
LYMPHOCYTES # BLD: 3.2 K/UL (ref 1.5–4)
LYMPHOCYTES NFR BLD: 33.5 %
MCH RBC QN AUTO: 31.1 PG (ref 27–32)
MCHC RBC AUTO-ENTMCNC: 33.8 G/DL (ref 31–35)
MCV RBC AUTO: 92 FL (ref 80–100)
MONOCYTES # BLD: 0.9 K/UL (ref 0.2–0.8)
MONOCYTES NFR BLD: 9.7 %
NEUTROPHILS # BLD: 5.1 K/UL (ref 2–7.5)
NEUTS SEG NFR BLD: 54.3 %
PLATELET # BLD AUTO: 265 K/UL (ref 150–400)
PMV BLD AUTO: 9 FL (ref 6–10)
POTASSIUM SERPL-SCNC: 4.3 MMOL/L (ref 3.4–5.1)
PROT SERPL-MCNC: 7.3 G/DL (ref 6.4–8.3)
RBC # BLD AUTO: 4.5 M/UL (ref 4.5–6)
SODIUM SERPL-SCNC: 140 MMOL/L (ref 136–145)
TRIGL SERPL-MCNC: 110 MG/DL (ref 0–249)
TSH SERPL DL<=0.005 MIU/L-ACNC: 4.07 UIU/ML (ref 0.27–4.2)
VIT B12 SERPL-MCNC: 453 PG/ML (ref 211–911)
VLDLC SERPL CALC-MCNC: 22 MG/DL
WBC # BLD AUTO: 9.4 K/UL (ref 4–11)

## 2024-09-12 PROCEDURE — 83036 HEMOGLOBIN GLYCOSYLATED A1C: CPT

## 2024-09-12 PROCEDURE — 36415 COLL VENOUS BLD VENIPUNCTURE: CPT

## 2024-09-12 PROCEDURE — 80061 LIPID PANEL: CPT

## 2024-09-12 PROCEDURE — 85025 COMPLETE CBC W/AUTO DIFF WBC: CPT

## 2024-09-12 PROCEDURE — 82607 VITAMIN B-12: CPT

## 2024-09-12 PROCEDURE — 82306 VITAMIN D 25 HYDROXY: CPT

## 2024-09-12 PROCEDURE — 82746 ASSAY OF FOLIC ACID SERUM: CPT

## 2024-09-12 PROCEDURE — 80053 COMPREHEN METABOLIC PANEL: CPT

## 2024-09-12 PROCEDURE — 84443 ASSAY THYROID STIM HORMONE: CPT

## 2024-09-12 PROCEDURE — 73130 X-RAY EXAM OF HAND: CPT

## 2024-10-24 ENCOUNTER — OFFICE VISIT (OUTPATIENT)
Age: 59
End: 2024-10-24
Payer: COMMERCIAL

## 2024-10-24 ENCOUNTER — LAB (OUTPATIENT)
Facility: HOSPITAL | Age: 59
End: 2024-10-24
Payer: COMMERCIAL

## 2024-10-24 VITALS
TEMPERATURE: 97.2 F | SYSTOLIC BLOOD PRESSURE: 130 MMHG | BODY MASS INDEX: 28.72 KG/M2 | DIASTOLIC BLOOD PRESSURE: 82 MMHG | HEIGHT: 67 IN | HEART RATE: 59 BPM | WEIGHT: 183 LBS

## 2024-10-24 DIAGNOSIS — M19.041 PRIMARY OSTEOARTHRITIS OF BOTH HANDS: ICD-10-CM

## 2024-10-24 DIAGNOSIS — M25.511 CHRONIC PAIN OF BOTH SHOULDERS: ICD-10-CM

## 2024-10-24 DIAGNOSIS — M54.2 NECK PAIN: ICD-10-CM

## 2024-10-24 DIAGNOSIS — R53.82 CHRONIC FATIGUE: ICD-10-CM

## 2024-10-24 DIAGNOSIS — M19.042 PRIMARY OSTEOARTHRITIS OF BOTH HANDS: ICD-10-CM

## 2024-10-24 DIAGNOSIS — M06.09 RHEUMATOID ARTHRITIS OF MULTIPLE SITES WITH NEGATIVE RHEUMATOID FACTOR: Primary | ICD-10-CM

## 2024-10-24 DIAGNOSIS — Z79.899 HIGH RISK MEDICATION USE: ICD-10-CM

## 2024-10-24 DIAGNOSIS — M06.09 RHEUMATOID ARTHRITIS OF MULTIPLE SITES WITH NEGATIVE RHEUMATOID FACTOR: ICD-10-CM

## 2024-10-24 DIAGNOSIS — G89.29 CHRONIC PAIN OF BOTH SHOULDERS: ICD-10-CM

## 2024-10-24 DIAGNOSIS — M25.512 CHRONIC PAIN OF BOTH SHOULDERS: ICD-10-CM

## 2024-10-24 DIAGNOSIS — Z79.52 LONG TERM SYSTEMIC STEROID USER: ICD-10-CM

## 2024-10-24 PROBLEM — M25.50 JOINT PAIN: Status: ACTIVE | Noted: 2024-10-24

## 2024-10-24 LAB
BASOPHILS # BLD AUTO: 0.08 10*3/MM3 (ref 0–0.2)
BASOPHILS NFR BLD AUTO: 0.8 % (ref 0–1.5)
DEPRECATED RDW RBC AUTO: 41 FL (ref 37–54)
EOSINOPHIL # BLD AUTO: 0.04 10*3/MM3 (ref 0–0.4)
EOSINOPHIL NFR BLD AUTO: 0.4 % (ref 0.3–6.2)
ERYTHROCYTE [DISTWIDTH] IN BLOOD BY AUTOMATED COUNT: 12.2 % (ref 12.3–15.4)
HCT VFR BLD AUTO: 42.9 % (ref 37.5–51)
HGB BLD-MCNC: 14.6 G/DL (ref 13–17.7)
IMM GRANULOCYTES # BLD AUTO: 0.03 10*3/MM3 (ref 0–0.05)
IMM GRANULOCYTES NFR BLD AUTO: 0.3 % (ref 0–0.5)
LYMPHOCYTES # BLD AUTO: 1.9 10*3/MM3 (ref 0.7–3.1)
LYMPHOCYTES NFR BLD AUTO: 18.8 % (ref 19.6–45.3)
MCH RBC QN AUTO: 31.3 PG (ref 26.6–33)
MCHC RBC AUTO-ENTMCNC: 34 G/DL (ref 31.5–35.7)
MCV RBC AUTO: 92.1 FL (ref 79–97)
MONOCYTES # BLD AUTO: 0.49 10*3/MM3 (ref 0.1–0.9)
MONOCYTES NFR BLD AUTO: 4.9 % (ref 5–12)
NEUTROPHILS NFR BLD AUTO: 7.54 10*3/MM3 (ref 1.7–7)
NEUTROPHILS NFR BLD AUTO: 74.8 % (ref 42.7–76)
NRBC BLD AUTO-RTO: 0 /100 WBC (ref 0–0.2)
PLATELET # BLD AUTO: 284 10*3/MM3 (ref 140–450)
PMV BLD AUTO: 9.4 FL (ref 6–12)
RBC # BLD AUTO: 4.66 10*6/MM3 (ref 4.14–5.8)
WBC NRBC COR # BLD AUTO: 10.08 10*3/MM3 (ref 3.4–10.8)

## 2024-10-24 PROCEDURE — 86200 CCP ANTIBODY: CPT

## 2024-10-24 PROCEDURE — 86431 RHEUMATOID FACTOR QUANT: CPT

## 2024-10-24 PROCEDURE — 85025 COMPLETE CBC W/AUTO DIFF WBC: CPT

## 2024-10-24 PROCEDURE — 83520 IMMUNOASSAY QUANT NOS NONAB: CPT

## 2024-10-24 PROCEDURE — 86140 C-REACTIVE PROTEIN: CPT

## 2024-10-24 PROCEDURE — 80074 ACUTE HEPATITIS PANEL: CPT

## 2024-10-24 PROCEDURE — 81374 HLA I TYPING 1 ANTIGEN LR: CPT

## 2024-10-24 PROCEDURE — 84550 ASSAY OF BLOOD/URIC ACID: CPT

## 2024-10-24 PROCEDURE — 80053 COMPREHEN METABOLIC PANEL: CPT

## 2024-10-24 PROCEDURE — 85652 RBC SED RATE AUTOMATED: CPT

## 2024-10-24 PROCEDURE — 36415 COLL VENOUS BLD VENIPUNCTURE: CPT

## 2024-10-24 NOTE — ASSESSMENT & PLAN NOTE
He has fairly severe osteoarthritis.  There is mild bony enlargement of the right second and third MCP.

## 2024-10-24 NOTE — ASSESSMENT & PLAN NOTE
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  X-ray today.

## 2024-10-24 NOTE — ASSESSMENT & PLAN NOTE
Diagnosed by Dr. Diallo: Ildefonso approximately 2017.  Treated in past with colchicine, methotrexate, and Plaquenil along with low-dose prednisone of 5 mg chronically.  Patient stopped methotrexate in 2020 and has been maintained on Plaquenil and 5 mg of prednisone by his primary care provider.    I see no active synovitis today.  I will x-ray the right hand, both feet, the left shoulder and his neck.  I will obtain several labs today.  I reviewed the x-ray of his left hand.  No erosions were seen and there was no chondrocalcinosis.  I told him it is very important for him to get ocular exam as soon as possible.  We discussed possible side effects of hydroxychloroquine including headache, nausea, diarrhea, rare retinal pigmentation, rare neuromuscular toxicity.  Recommend eye exams every 6 to 12 months to monitor for retinal toxicity.  If we see evidence of active synovitis, or if laboratory or exam shows active disease and/or erosions, we could restart methotrexate or consider leflunomide.  I will see him in follow-up in about 6 weeks.

## 2024-10-24 NOTE — PROGRESS NOTES
Office Visit       Date: 10/24/2024   Patient Name: Buzz Cox  MRN: [id number]  YOB: 1965    Referring Physician: Beata Lou APRN     Chief Complaint: Joint pain    History of Present Illness: Buzz Cox is a 58 y.o. male who is here today in consultation for joint pain in a patient with a history of seronegative rheumatoid arthritis.  He had previously been followed by Dr. Imani Fregoso who made the diagnosis of seronegative rheumatoid arthritis.  He was initially treated with hydroxychloroquine with some relief.  He has been on long-term low-dose prednisone of 5 mg, for at least 7 years.  He was initially treated with colchicine without improvement.  He was on methotrexate until 2020 and he discontinued at that time due to the fear of COVID.  He has maintained on hydroxychloroquine 200 mg twice daily and prednisone 5 mg daily.  He takes occasional over-the-counter ibuprofen.  He has not been seen by rheumatologist since 2020.  He also states he has not had an ocular exam.    Currently, he is complaining of pain in his neck and both shoulders.  He has known rotator cuff disease and has had repair on the right and was told he needed repair on the left.  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  He is having bilateral hand pain, especially in the second and third MCP, without swelling.    He has morning stiffness lasting about 15 minutes.  His shoulders, neck, and trochanters are his most painful area.  He has also had pain in his feet.  His activities of daily living are somewhat limited.    He has noticed bony enlargement of the right first MTP.  He will occasionally have redness and swelling with this.  This usually last 2 to 3 days.  He has no previous history of gout or gout-like flares prior to this.    He has had no knots or nodules on his skin.  He has no history of psoriasis.  He has had no  ocular inflammation.  He has no history suggestive of inflammatory bowel disease.    He had recent x-rays of his left hand which showed osteoarthritis.  There was no mention in the report of chondrocalcinosis or erosions.      Subjective   Review of Systems: Review of Systems     Past Medical History:   Past Medical History:   Diagnosis Date    Arthritis     CAD (coronary artery disease)     Cancer     bladder- Dr Gore    Heart attack     Heart attack     High cholesterol     Hyperlipidemia     Hypertension     Myocardial infarction     Pain in joint of right shoulder     tight shoulder pain -Multiple partial tears of tendons-MRI 2018- Dr Monzon       Past Surgical History:   Past Surgical History:   Procedure Laterality Date    BLADDER TUMOR EXCISION      CARDIAC SURGERY      CORONARY ANGIOPLASTY WITH STENT PLACEMENT      ENDOSCOPY  02/06/2024    SHOULDER SURGERY Right 08/06/2019    s/p surgery on right -Dr Rosales. Has had biceps tendonitis on L- S/p injection Dr Rosales with relief       Family History:   Family History   Problem Relation Age of Onset    No Known Problems Mother     Other (carbon dioxide) Father     Other (brain tumor) Sister 33    Arthritis Sister        Social History:   Social History     Socioeconomic History    Marital status:     Number of children: 1   Tobacco Use    Smoking status: Former    Smokeless tobacco: Never   Vaping Use    Vaping status: Never Used   Substance and Sexual Activity    Alcohol use: No    Drug use: No    Sexual activity: Defer       Medications:   Current Outpatient Medications:     aspirin 81 MG EC tablet, Take 1 tablet by mouth Daily., Disp: , Rfl:     atorvastatin (LIPITOR) 20 MG tablet, Take 1 tablet by mouth Daily., Disp: , Rfl:     baclofen (LIORESAL) 10 MG tablet, Take 1 tablet by mouth 3 times a day., Disp: , Rfl:     diazePAM (VALIUM) 10 MG tablet, , Disp: , Rfl: 0    Diclofenac Sodium (VOLTAREN) 1 % gel gel, APPLY TOPICALLY TO HANDS TWICE DAILY,  Disp: , Rfl:     famotidine (PEPCID) 40 MG tablet, Take 1 tablet by mouth Every Evening., Disp: 30 tablet, Rfl: 11    HYDROcodone-acetaminophen (NORCO)  MG per tablet, , Disp: , Rfl: 0    hydroxychloroquine (PLAQUENIL) 200 MG tablet, , Disp: , Rfl:     ibuprofen (ADVIL,MOTRIN) 400 MG tablet, , Disp: , Rfl: 0    lisinopril (PRINIVIL,ZESTRIL) 5 MG tablet, Take 1 tablet by mouth Daily., Disp: , Rfl:     metoprolol succinate XL (TOPROL-XL) 25 MG 24 hr tablet, Take 1 tablet by mouth Daily., Disp: , Rfl: 0    multivitamin with minerals (ONE DAILY FOR MEN 50+ ADVANCED PO), Take 1 tablet by mouth Daily., Disp: , Rfl:     omeprazole (priLOSEC) 40 MG capsule, Take 1 capsule by mouth Daily., Disp: , Rfl:     predniSONE (DELTASONE) 5 MG tablet, , Disp: , Rfl: 0    rosuvastatin (CRESTOR) 20 MG tablet, Take 1 tablet by mouth Daily., Disp: , Rfl:     bisacodyl (Dulcolax) 5 MG EC tablet, Take 2 at 3pm and 2 at 7pm day prior to colonoscopy. (Patient not taking: Reported on 10/24/2024), Disp: 4 tablet, Rfl: 0    bisacodyl 5 MG EC tablet, Use as directed. (Patient not taking: Reported on 10/24/2024), Disp: 4 tablet, Rfl: 0    colchicine 0.6 MG tablet, , Disp: , Rfl: 0    doxycycline (MONODOX) 100 MG capsule, Take 1 capsule by mouth 2 (Two) Times a Day. (Patient not taking: Reported on 10/24/2024), Disp: 20 capsule, Rfl: 0    finasteride (PROSCAR) 5 MG tablet, , Disp: , Rfl: 0    folic acid (FOLVITE) 1 MG tablet, Take 1 tablet by mouth Daily. Except day of methotrexate day (Patient not taking: Reported on 10/24/2024), Disp: , Rfl:     lisinopril (PRINIVIL,ZESTRIL) 2.5 MG tablet, , Disp: , Rfl:     methotrexate 2.5 MG tablet, Take 6 tablets by mouth 1 (One) Time Per Week. (Patient not taking: Reported on 10/24/2024), Disp: , Rfl:     polyethylene glycol (GlycoLax) 17 GM/SCOOP powder, Mix 238 g with 64  Oz clear liquid. Starting at 5 PM drinking 8 oz every 10 to 15 min until consumed (Patient not taking: Reported on 10/24/2024),  "Disp: 238 g, Rfl: 0    polyethylene glycol (MIRALAX) 17 GM/SCOOP powder, Mix 238g powder with 64 oz of clear liquid. Use as directed. (Patient not taking: Reported on 10/24/2024), Disp: 238 g, Rfl: 0    Turmeric 500 MG capsule, Take  by mouth Daily. (Patient not taking: Reported on 10/24/2024), Disp: , Rfl:     Allergies:   Allergies   Allergen Reactions    Adhesive Tape Unknown - Low Severity       I have reviewed and updated the patient's chief complaint, history of present illness, review of systems, past medical history, surgical history, family history, social history, medications and allergy list as appropriate.     Objective    Vital Signs:   Vitals:    10/24/24 1253   BP: 130/82   BP Location: Left arm   Patient Position: Sitting   Cuff Size: Adult   Pulse: 59   Temp: 97.2 °F (36.2 °C)   Weight: 83 kg (183 lb)   Height: 170.2 cm (67\")   PainSc:   8   PainLoc: Shoulder     Body mass index is 28.66 kg/m².     Physical Exam:  General: The patient is well-developed and well nourished. Cooperative, alert and oriented x3. Affect is normal. Hydration appears normal.   HEENT: Normocephalic and atraumatic.  Male pattern hair loss.  Lids and conjunctiva are normal. Pupils are equal and sclera are clear. Oropharynx is clear   NECK: Supple without adenopathy, masses or thyromegaly.   CARDIOVASCULAR: Regular rate and rhythm. No murmurs, rubs or gallops   LUNGS: Effort is normal. Lungs are clear bilaterally.  ABDOMEN: Soft and non-tender without masses or hepatosplenomegaly..   EXTREMITIES: No edema.  No cyanosis or clubbing.  SKIN: Inspection and palpation are normal.  NEUROLOGIC: Gait is normal.  Deep tendon reflexes are 2+ and symmetric.  MUSCULOSKELETAL: Complete joint exam was performed. There was full range of motion of the shoulders, elbows, wrists and hands without soft tissue swelling synovitis or deformities except as noted.  Right shoulder has full range of motion with mild crepitus but no pain.  Left shoulder " has pain at 85 to 90 degrees abduction and slightly limited internal and external rotation.  Elbows are unremarkable without nodules.  Wrists have good range of motion.  There is squaring of the first CMC joint bilaterally.  The right second and third digit have slight bony enlargement.  I see no synovitis.  Heberden's and Linh's nodes are present.  I see no active synovitis.  Hips have good flexion and internal and external rotation.  Trochanters are slightly tender.  Knees have no palpable effusions.  There is full extension and flexion.  Ankles and feet have no soft tissue swelling or synovitis.  There is bony enlargement of the first MTP bilaterally with the right being much worse than the left.  Degenerative changes are seen in the toes.  I see no synovitis.  BACK: Neck has limited range of motion.  There is mild kyphosis.    Assessment / Plan        Assessment & Plan  Rheumatoid arthritis of multiple sites with negative rheumatoid factor  Diagnosed by Dr. Diallo: [place] approximately 2017.  Treated in past with colchicine, methotrexate, and Plaquenil along with low-dose prednisone of 5 mg chronically.  Patient stopped methotrexate in 2020 and has been maintained on Plaquenil and 5 mg of prednisone by his primary care provider.    I see no active synovitis today.  I will x-ray the right hand, both feet, the left shoulder and his neck.  I will obtain several labs today.  I reviewed the x-ray of his left hand.  No erosions were seen and there was no chondrocalcinosis.  I told him it is very important for him to get ocular exam as soon as possible.  We discussed possible side effects of hydroxychloroquine including headache, nausea, diarrhea, rare retinal pigmentation, rare neuromuscular toxicity.  Recommend eye exams every 6 to 12 months to monitor for retinal toxicity.  If we see evidence of active synovitis, or if laboratory or exam shows active disease and/or erosions, we could restart methotrexate or  consider leflunomide.  I will see him in follow-up in about 6 weeks.  Primary osteoarthritis of both hands  He has fairly severe osteoarthritis.  There is mild bony enlargement of the right second and third MCP.  Chronic pain of both shoulders  History of rotator cuff disease with repair on the right.  I will x-ray the left shoulder today.  High risk medication use  Hydroxychloroquine.  I told him of the need for ocular exam ASAP.  Long term systemic steroid user  He has been on 5 mg for approximately 7 years.  Neck pain  He apparently had a congenital anomaly and a fracture of his neck at some point according to history.  X-ray today.  Chronic fatigue  This remains a problem.    Orders Placed This Encounter   Procedures    XR Hand 3+ View Right    XR Foot 3+ View Bilateral    XR Shoulder 2+ View Left    XR Spine Cervical 2 View    14.3.3 ETA, Rheum. Arthritis    C-reactive Protein    Cyclic Citrul Peptide Antibody, IgG / IgA    Rheumatoid Factor    Sedimentation Rate    Hepatitis Panel, Acute    HLA-B27 Antigen    Comprehensive Metabolic Panel    CBC Auto Differential    Uric Acid             Follow Up:   Return in about 6 weeks (around 12/5/2024).        Lucian Marina MD  [facility] [place]

## 2024-10-24 NOTE — PROGRESS NOTES
Office Visit       Date: 10/24/2024   Patient Name: Buzz Cox  MRN: [id number]  YOB: 1965    Referring Physician: Beata Lou APRN     Chief Complaint:   Chief Complaint   Patient presents with   • Establish Care       History of Present Illness: Buzz Cox is a 58 y.o. male who is here today ***      Subjective   Review of Systems: Review of Systems   Constitutional:  Negative for chills, fatigue, fever and unexpected weight loss.   HENT:  Negative for dental problem, mouth sores, sinus pressure and swollen glands.    Eyes:  Negative for photophobia, pain and redness.   Respiratory:  Negative for apnea, cough, chest tightness and shortness of breath.    Cardiovascular:  Negative for chest pain and leg swelling.   Gastrointestinal:  Negative for abdominal pain, diarrhea, nausea and GERD.   Genitourinary:  Negative for dysuria and hematuria.   Musculoskeletal:  Positive for arthralgias, back pain, joint swelling, neck pain and neck stiffness.        Joint tenderness  Morning stiffness  Muscle cramping   Skin:  Positive for bruise. Negative for dry skin, rash and skin lesions.   Neurological:  Negative for dizziness, seizures, syncope, weakness, headache and memory problem.   Hematological:  Negative for adenopathy. Bruises/bleeds easily.   Psychiatric/Behavioral:  Negative for sleep disturbance, suicidal ideas, depressed mood and stress. The patient is not nervous/anxious.       Past Medical History:   Past Medical History:   Diagnosis Date   • Arthritis    • CAD (coronary artery disease)    • Cancer     bladder- Dr Gore   • Heart attack    • Heart attack    • High cholesterol    • Hyperlipidemia    • Hypertension    • Myocardial infarction    • Pain in joint of right shoulder     tight shoulder pain -Multiple partial tears of tendons-MRI 2018- Dr Monzon       Past Surgical History:   Past Surgical History:   Procedure Laterality Date   • BLADDER  TUMOR EXCISION     • CARDIAC SURGERY     • CORONARY ANGIOPLASTY WITH STENT PLACEMENT     • ENDOSCOPY  02/06/2024   • SHOULDER SURGERY Right 08/06/2019    s/p surgery on right -Dr Rosales. Has had biceps tendonitis on L- S/p injection Dr Rosales with relief       Family History:   Family History   Problem Relation Age of Onset   • No Known Problems Mother    • Other (carbon dioxide) Father    • Other (brain tumor) Sister 33   • Arthritis Sister        Social History:   Social History     Socioeconomic History   • Marital status:    • Number of children: 1   Tobacco Use   • Smoking status: Former   • Smokeless tobacco: Never   Vaping Use   • Vaping status: Never Used   Substance and Sexual Activity   • Alcohol use: No   • Drug use: No   • Sexual activity: Defer       Medications:   Current Outpatient Medications:   •  aspirin 81 MG EC tablet, Take 1 tablet by mouth Daily., Disp: , Rfl:   •  atorvastatin (LIPITOR) 20 MG tablet, Take 1 tablet by mouth Daily., Disp: , Rfl:   •  baclofen (LIORESAL) 10 MG tablet, Take 1 tablet by mouth 3 times a day., Disp: , Rfl:   •  diazePAM (VALIUM) 10 MG tablet, , Disp: , Rfl: 0  •  Diclofenac Sodium (VOLTAREN) 1 % gel gel, APPLY TOPICALLY TO HANDS TWICE DAILY, Disp: , Rfl:   •  famotidine (PEPCID) 40 MG tablet, Take 1 tablet by mouth Every Evening., Disp: 30 tablet, Rfl: 11  •  HYDROcodone-acetaminophen (NORCO)  MG per tablet, , Disp: , Rfl: 0  •  hydroxychloroquine (PLAQUENIL) 200 MG tablet, , Disp: , Rfl:   •  ibuprofen (ADVIL,MOTRIN) 400 MG tablet, , Disp: , Rfl: 0  •  lisinopril (PRINIVIL,ZESTRIL) 5 MG tablet, Take 1 tablet by mouth Daily., Disp: , Rfl:   •  metoprolol succinate XL (TOPROL-XL) 25 MG 24 hr tablet, Take 1 tablet by mouth Daily., Disp: , Rfl: 0  •  multivitamin with minerals (ONE DAILY FOR MEN 50+ ADVANCED PO), Take 1 tablet by mouth Daily., Disp: , Rfl:   •  omeprazole (priLOSEC) 40 MG capsule, Take 1 capsule by mouth Daily., Disp: , Rfl:   •   predniSONE (DELTASONE) 5 MG tablet, , Disp: , Rfl: 0  •  rosuvastatin (CRESTOR) 20 MG tablet, Take 1 tablet by mouth Daily., Disp: , Rfl:   •  bisacodyl (Dulcolax) 5 MG EC tablet, Take 2 at 3pm and 2 at 7pm day prior to colonoscopy. (Patient not taking: Reported on 10/24/2024), Disp: 4 tablet, Rfl: 0  •  bisacodyl 5 MG EC tablet, Use as directed. (Patient not taking: Reported on 10/24/2024), Disp: 4 tablet, Rfl: 0  •  colchicine 0.6 MG tablet, , Disp: , Rfl: 0  •  doxycycline (MONODOX) 100 MG capsule, Take 1 capsule by mouth 2 (Two) Times a Day. (Patient not taking: Reported on 10/24/2024), Disp: 20 capsule, Rfl: 0  •  finasteride (PROSCAR) 5 MG tablet, , Disp: , Rfl: 0  •  folic acid (FOLVITE) 1 MG tablet, Take 1 tablet by mouth Daily. Except day of methotrexate day (Patient not taking: Reported on 10/24/2024), Disp: , Rfl:   •  lisinopril (PRINIVIL,ZESTRIL) 2.5 MG tablet, , Disp: , Rfl:   •  methotrexate 2.5 MG tablet, Take 6 tablets by mouth 1 (One) Time Per Week. (Patient not taking: Reported on 10/24/2024), Disp: , Rfl:   •  polyethylene glycol (GlycoLax) 17 GM/SCOOP powder, Mix 238 g with 64  Oz clear liquid. Starting at 5 PM drinking 8 oz every 10 to 15 min until consumed (Patient not taking: Reported on 10/24/2024), Disp: 238 g, Rfl: 0  •  polyethylene glycol (MIRALAX) 17 GM/SCOOP powder, Mix 238g powder with 64 oz of clear liquid. Use as directed. (Patient not taking: Reported on 10/24/2024), Disp: 238 g, Rfl: 0  •  Turmeric 500 MG capsule, Take  by mouth Daily. (Patient not taking: Reported on 10/24/2024), Disp: , Rfl:     Allergies:   Allergies   Allergen Reactions   • Adhesive Tape Unknown - Low Severity       I have reviewed and updated the patient's chief complaint, history of present illness, review of systems, past medical history, surgical history, family history, social history, medications and allergy list as appropriate.     Objective    Vital Signs:   Vitals:    10/24/24 1253   BP: 130/82   BP  "Location: Left arm   Patient Position: Sitting   Cuff Size: Adult   Pulse: 59   Temp: 97.2 °F (36.2 °C)   Weight: 83 kg (183 lb)   Height: 170.2 cm (67\")   PainSc:   8   PainLoc: Shoulder     Body mass index is 28.66 kg/m².     Physical Exam:      Assessment / Plan        Assessment & Plan               Follow Up:   No follow-ups on file.        Lucian Marina MD  [facility] [place]   "

## 2024-10-25 ENCOUNTER — HOSPITAL ENCOUNTER (OUTPATIENT)
Dept: GENERAL RADIOLOGY | Facility: HOSPITAL | Age: 59
Discharge: HOME | End: 2024-10-25
Payer: MEDICAID

## 2024-10-25 ENCOUNTER — HOSPITAL ENCOUNTER (OUTPATIENT)
Facility: HOSPITAL | Age: 59
Discharge: HOME | End: 2024-10-25
Payer: MEDICAID

## 2024-10-25 DIAGNOSIS — N13.30 HYDROCALYCOSIS: ICD-10-CM

## 2024-10-25 DIAGNOSIS — R31.1 BENIGN ESSENTIAL MICROSCOPIC HEMATURIA: ICD-10-CM

## 2024-10-25 DIAGNOSIS — N13.8 ENLARGED PROSTATE WITH URINARY OBSTRUCTION: ICD-10-CM

## 2024-10-25 DIAGNOSIS — R97.20 ELEVATED PROSTATE SPECIFIC ANTIGEN (PSA): ICD-10-CM

## 2024-10-25 DIAGNOSIS — N40.1 ENLARGED PROSTATE WITH URINARY OBSTRUCTION: ICD-10-CM

## 2024-10-25 LAB
ALBUMIN SERPL-MCNC: 4.6 G/DL (ref 3.4–4.8)
ALBUMIN SERPL-MCNC: 4.8 G/DL (ref 3.5–5.2)
ALBUMIN/GLOB SERPL: 1.5 G/DL
ALBUMIN/GLOB SERPL: 2.1 {RATIO} (ref 0.8–2)
ALP SERPL-CCNC: 68 U/L (ref 25–100)
ALP SERPL-CCNC: 71 U/L (ref 39–117)
ALT SERPL W P-5'-P-CCNC: 22 U/L (ref 1–41)
ALT SERPL-CCNC: 18 U/L (ref 4–36)
ANION GAP SERPL CALCULATED.3IONS-SCNC: 10 MMOL/L (ref 3–16)
ANION GAP SERPL CALCULATED.3IONS-SCNC: 7.1 MMOL/L (ref 5–15)
AST SERPL-CCNC: 19 U/L (ref 8–33)
AST SERPL-CCNC: 21 U/L (ref 1–40)
BILIRUB SERPL-MCNC: 0.3 MG/DL (ref 0.3–1.2)
BILIRUB SERPL-MCNC: 0.5 MG/DL (ref 0–1.2)
BILIRUB UR QL STRIP.AUTO: NEGATIVE
BUN SERPL-MCNC: 16 MG/DL (ref 6–20)
BUN SERPL-MCNC: 20 MG/DL (ref 6–20)
BUN/CREAT SERPL: 14.5 (ref 7–25)
CALCIUM SERPL-MCNC: 9.8 MG/DL (ref 8.5–10.5)
CALCIUM SPEC-SCNC: 11 MG/DL (ref 8.6–10.5)
CHLORIDE SERPL-SCNC: 100 MMOL/L (ref 98–107)
CHLORIDE SERPL-SCNC: 102 MMOL/L (ref 98–107)
CHROMATIN AB SERPL-ACNC: <10 IU/ML (ref 0–14)
CLARITY UR: CLEAR
CO2 SERPL-SCNC: 26.9 MMOL/L (ref 22–29)
CO2 SERPL-SCNC: 28 MMOL/L (ref 20–30)
COLOR UR: YELLOW
CREAT SERPL-MCNC: 1 MG/DL (ref 0.4–1.2)
CREAT SERPL-MCNC: 1.1 MG/DL (ref 0.76–1.27)
CRP SERPL-MCNC: <0.3 MG/DL (ref 0–0.5)
EGFRCR SERPLBLD CKD-EPI 2021: 77.3 ML/MIN/1.73
ERYTHROCYTE [SEDIMENTATION RATE] IN BLOOD: 19 MM/HR (ref 0–20)
GFR SERPLBLD CREATININE-BSD FMLA CKD-EPI: 86 ML/MIN/{1.73_M2}
GLOBULIN SER CALC-MCNC: 2.2 G/DL
GLOBULIN UR ELPH-MCNC: 3.2 GM/DL
GLUCOSE SERPL-MCNC: 108 MG/DL (ref 65–99)
GLUCOSE SERPL-MCNC: 95 MG/DL (ref 74–106)
GLUCOSE UR STRIP.AUTO-MCNC: NEGATIVE MG/DL
HAV IGM SERPL QL IA: NORMAL
HBV CORE IGM SERPL QL IA: NORMAL
HBV SURFACE AG SERPL QL IA: NORMAL
HCV AB SER QL: NORMAL
HGB UR QL STRIP.AUTO: NEGATIVE
KETONES UR STRIP.AUTO-MCNC: NEGATIVE MG/DL
LEUKOCYTE ESTERASE UR QL STRIP.AUTO: NEGATIVE
NITRITE UR QL STRIP.AUTO: NEGATIVE
PH UR STRIP.AUTO: 5.5 [PH] (ref 5–8)
POTASSIUM SERPL-SCNC: 4.3 MMOL/L (ref 3.4–5.1)
POTASSIUM SERPL-SCNC: 4.8 MMOL/L (ref 3.5–5.2)
PROT SERPL-MCNC: 6.8 G/DL (ref 6.4–8.3)
PROT SERPL-MCNC: 8 G/DL (ref 6–8.5)
PROT UR STRIP.AUTO-MCNC: NEGATIVE MG/DL
PSA SERPL DL<=0.01 NG/ML-MCNC: 0.63 NG/ML (ref 0–4)
SODIUM SERPL-SCNC: 136 MMOL/L (ref 136–145)
SODIUM SERPL-SCNC: 138 MMOL/L (ref 136–145)
SP GR UR STRIP.AUTO: 1.01 (ref 1–1.03)
UA DIPSTICK W REFLEX MICRO PNL UR: NORMAL
URATE SERPL-MCNC: 5.1 MG/DL (ref 3.4–7)
URN SPEC COLLECT METH UR: NORMAL
UROBILINOGEN UR STRIP-ACNC: 0.2 E.U./DL

## 2024-10-25 PROCEDURE — 36415 COLL VENOUS BLD VENIPUNCTURE: CPT

## 2024-10-25 PROCEDURE — 81003 URINALYSIS AUTO W/O SCOPE: CPT

## 2024-10-25 PROCEDURE — 87086 URINE CULTURE/COLONY COUNT: CPT

## 2024-10-25 PROCEDURE — 74018 RADEX ABDOMEN 1 VIEW: CPT

## 2024-10-25 PROCEDURE — 80053 COMPREHEN METABOLIC PANEL: CPT

## 2024-10-25 PROCEDURE — 84153 ASSAY OF PSA TOTAL: CPT

## 2024-10-26 LAB
BACTERIA UR CULT: NORMAL
CCP IGA+IGG SERPL IA-ACNC: 5 UNITS (ref 0–19)

## 2024-10-30 LAB — HLA-B27 QL NAA+PROBE: NEGATIVE

## 2024-11-04 LAB — 14-3-3 ETA AG SER IA-MCNC: <0.2 NG/ML

## 2024-12-04 NOTE — PROGRESS NOTES
Office Visit       Date: 12/05/2024   Patient Name: Buzz Cox  MRN: [id number]  YOB: 1965    Referring Physician: No ref. provider found     Chief Complaint: Joint pain    History of Present Illness: Buzz Cox is a 58 y.o. male who is here today in follow-up for joint pain in a patient with a history of seronegative rheumatoid arthritis.  He had previously been followed by Dr. Imani Fregoso who made the diagnosis of seronegative rheumatoid arthritis.  He was initially treated with hydroxychloroquine with some relief.  He has been on long-term low-dose prednisone of 5 mg, for at least 7 years.  He was initially treated with colchicine without improvement.  He was on methotrexate until 2020 and he discontinued at that time due to the fear of COVID.  He has maintained on hydroxychloroquine 200 mg twice daily and prednisone 5 mg daily.  He takes occasional over-the-counter ibuprofen.   He also states he has not had an ocular exam since last visit..    Currently, he is complaining of pain in his neck and both shoulders. He is having increased L shoulder pain.   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  He is having bilateral hand pain, especially in the second and third MCP, without swelling.    He has morning stiffness lasting about 30 minutes.  Pain level is 8/10. Pain is mainly in L shoulder.     He has noticed bony enlargement of the right first MTP.  He will occasionally have redness and swelling with this.  This usually last 2 to 3 days.  He has no previous history of gout or gout-like flares prior to this.    Labs from 10/24 showed normal CRP and sedimentation rate, negative rheumatoid factor, CCP, and 14.3.3 ETA.  Negative HLA-B27.  Uric acid was 5.1.  CMP and CBC were remarkable only for elevated calcium at 11.  He had recent x-rays of his hands which showed osteoarthritis.  There was no mention in the  report of chondrocalcinosis or erosions.  X-rays of the feet also showed degenerative changes with some hypertrophic changes.  X-rays of the left shoulder showed degenerative changes considered mild.  X-ray of the neck showed facet hypertrophy at C6/7.      Subjective   Review of Systems: Review of Systems   Constitutional:  Negative for chills, fatigue, fever and unexpected weight loss.   HENT:  Negative for dental problem, mouth sores, sinus pressure and swollen glands.    Eyes:  Negative for photophobia, pain and redness.   Respiratory:  Negative for apnea, cough, chest tightness and shortness of breath.    Cardiovascular:  Negative for chest pain and leg swelling.   Gastrointestinal:  Negative for abdominal pain, diarrhea, nausea and GERD.   Genitourinary:  Negative for dysuria and hematuria.   Musculoskeletal:  Positive for back pain, joint swelling, neck pain and neck stiffness.   Skin:  Positive for bruise. Negative for dry skin, rash and skin lesions.   Neurological:  Negative for dizziness, seizures, syncope, weakness, headache and memory problem.   Hematological:  Negative for adenopathy. Bruises/bleeds easily.   Psychiatric/Behavioral:  Negative for sleep disturbance, suicidal ideas, depressed mood and stress. The patient is not nervous/anxious.    All other systems reviewed and are negative.       Past Medical History:   Past Medical History:   Diagnosis Date    Arthritis     CAD (coronary artery disease)     Cancer     bladder- Dr Gore    Heart attack     Heart attack     High cholesterol     Hyperlipidemia     Hypertension     Myocardial infarction     Pain in joint of right shoulder     tight shoulder pain -Multiple partial tears of tendons-MRI 2018- Dr Monzon       Past Surgical History:   Past Surgical History:   Procedure Laterality Date    BLADDER TUMOR EXCISION      CARDIAC SURGERY      CORONARY ANGIOPLASTY WITH STENT PLACEMENT      ENDOSCOPY  02/06/2024    SHOULDER SURGERY Right 08/06/2019     s/p surgery on right -Dr Rosales. Has had biceps tendonitis on L- S/p injection Dr Rosales with relief       Family History:   Family History   Problem Relation Age of Onset    No Known Problems Mother     Other (carbon dioxide) Father     Other (brain tumor) Sister 33    Arthritis Sister        Social History:   Social History     Socioeconomic History    Marital status:     Number of children: 1   Tobacco Use    Smoking status: Former     Passive exposure: Past    Smokeless tobacco: Never   Vaping Use    Vaping status: Never Used   Substance and Sexual Activity    Alcohol use: No    Drug use: No    Sexual activity: Defer       Medications:   Current Outpatient Medications:     aspirin 81 MG EC tablet, Take 1 tablet by mouth Daily., Disp: , Rfl:     atorvastatin (LIPITOR) 20 MG tablet, Take 1 tablet by mouth Daily., Disp: , Rfl:     baclofen (LIORESAL) 10 MG tablet, Take 1 tablet by mouth 3 times a day., Disp: , Rfl:     bisacodyl (Dulcolax) 5 MG EC tablet, Take 2 at 3pm and 2 at 7pm day prior to colonoscopy., Disp: 4 tablet, Rfl: 0    bisacodyl 5 MG EC tablet, Use as directed., Disp: 4 tablet, Rfl: 0    colchicine 0.6 MG tablet, , Disp: , Rfl: 0    diazePAM (VALIUM) 10 MG tablet, , Disp: , Rfl: 0    Diclofenac Sodium (VOLTAREN) 1 % gel gel, APPLY TOPICALLY TO HANDS TWICE DAILY, Disp: , Rfl:     doxycycline (MONODOX) 100 MG capsule, Take 1 capsule by mouth 2 (Two) Times a Day., Disp: 20 capsule, Rfl: 0    famotidine (PEPCID) 40 MG tablet, Take 1 tablet by mouth Every Evening., Disp: 30 tablet, Rfl: 11    finasteride (PROSCAR) 5 MG tablet, , Disp: , Rfl: 0    folic acid (FOLVITE) 1 MG tablet, Take 1 tablet by mouth Daily. Except day of methotrexate day, Disp: , Rfl:     HYDROcodone-acetaminophen (NORCO)  MG per tablet, , Disp: , Rfl: 0    hydroxychloroquine (PLAQUENIL) 200 MG tablet, , Disp: , Rfl:     ibuprofen (ADVIL,MOTRIN) 400 MG tablet, , Disp: , Rfl: 0    lisinopril (PRINIVIL,ZESTRIL) 2.5 MG  "tablet, , Disp: , Rfl:     lisinopril (PRINIVIL,ZESTRIL) 5 MG tablet, Take 1 tablet by mouth Daily., Disp: , Rfl:     methotrexate 2.5 MG tablet, Take 6 tablets by mouth 1 (One) Time Per Week., Disp: , Rfl:     metoprolol succinate XL (TOPROL-XL) 25 MG 24 hr tablet, Take 1 tablet by mouth Daily., Disp: , Rfl: 0    multivitamin with minerals (ONE DAILY FOR MEN 50+ ADVANCED PO), Take 1 tablet by mouth Daily., Disp: , Rfl:     omeprazole (priLOSEC) 40 MG capsule, Take 1 capsule by mouth Daily., Disp: , Rfl:     polyethylene glycol (GlycoLax) 17 GM/SCOOP powder, Mix 238 g with 64  Oz clear liquid. Starting at 5 PM drinking 8 oz every 10 to 15 min until consumed, Disp: 238 g, Rfl: 0    polyethylene glycol (MIRALAX) 17 GM/SCOOP powder, Mix 238g powder with 64 oz of clear liquid. Use as directed., Disp: 238 g, Rfl: 0    predniSONE (DELTASONE) 5 MG tablet, , Disp: , Rfl: 0    rosuvastatin (CRESTOR) 20 MG tablet, Take 1 tablet by mouth Daily., Disp: , Rfl:     Turmeric 500 MG capsule, Take  by mouth Daily., Disp: , Rfl:     Allergies:   Allergies   Allergen Reactions    Adhesive Tape Unknown - Low Severity       I have reviewed and updated the patient's chief complaint, history of present illness, review of systems, past medical history, surgical history, family history, social history, medications and allergy list as appropriate.     Objective    Vital Signs:   Vitals:    12/05/24 1040   BP: 120/72   Pulse: 60   Temp: 97.7 °F (36.5 °C)   Weight: 89.3 kg (196 lb 12.8 oz)   Height: 170.2 cm (67.01\")   PainSc:   8   PainLoc: Shoulder  Comment: Left       Body mass index is 30.82 kg/m².     Physical Exam:  General: The patient is well-developed and well nourished. Cooperative, alert and oriented x3. Affect is normal. Hydration appears normal.   HEENT: Normocephalic and atraumatic.  Male pattern hair loss.  Lids and conjunctiva are normal. Pupils are equal and sclera are clear. Oropharynx is clear   NECK: Supple without " adenopathy, masses or thyromegaly.   CARDIOVASCULAR: Regular rate and rhythm. No murmurs, rubs or gallops   LUNGS: Effort is normal. Lungs are clear bilaterally.  ABDOMEN: Soft and non-tender without masses or hepatosplenomegaly..   EXTREMITIES: No edema.  No cyanosis or clubbing.  SKIN: Inspection and palpation are normal.  NEUROLOGIC: Gait is normal.  Deep tendon reflexes are 2+ and symmetric.  MUSCULOSKELETAL: Complete joint exam was performed. There was full range of motion of the shoulders, elbows, wrists and hands without soft tissue swelling synovitis or deformities except as noted.  Right shoulder has full range of motion with mild crepitus but no pain.  Left shoulder has pain at 85 to 90 degrees abduction and slightly limited internal and external rotation.  Elbows are unremarkable without nodules.  Wrists have good range of motion.  There is squaring of the first CMC joint bilaterally.  The right second and third digit have slight bony enlargement.  I see no synovitis.  Heberden's and Linh's nodes are present.  I see no active synovitis.  Hips have good flexion and internal and external rotation.  Trochanters are slightly tender.  Knees have no palpable effusions.  There is full extension and flexion.  Ankles and feet have no soft tissue swelling or synovitis.  There is bony enlargement of the first MTP bilaterally with the right being much worse than the left.  Degenerative changes are seen in the toes.  I see no synovitis.  BACK: Neck has limited range of motion.  There is mild kyphosis.  Joint Exam 12/05/2024        Right  Left   Glenohumeral      Tender     The following joints were examined and normal:   Right Glenohumeral, Left Elbow, Right Elbow, Left Wrist, Right Wrist, Left MCP 1, Right MCP 1, Left MCP 2, Right MCP 2, Left MCP 3, Right MCP 3, Left MCP 4, Right MCP 4, Left MCP 5, Right MCP 5, Left IP (thumb), Right IP (thumb), Left PIP 2 (finger), Right PIP 2 (finger), Left PIP 3 (finger),  Right PIP 3 (finger), Left PIP 4 (finger), Right PIP 4 (finger), Left PIP 5 (finger), Right PIP 5 (finger), Left Knee, Right Knee       Patient Global: 50 / 100  Provider Global: 0 / 100      Assessment / Plan        Assessment & Plan  Rheumatoid arthritis of multiple sites with negative rheumatoid factor  Diagnosed by Dr. Diallo: Ildefonso approximately 2017.  Treated in past with colchicine, methotrexate, and Plaquenil along with low-dose prednisone of 5 mg chronically.  Patient stopped methotrexate in 2020 and has been maintained on Plaquenil and 5 mg of prednisone by his primary care provider.  Rheumatoid factor negative.  CCP negative.  14.3.3 ETA negative.  CRP normal.  HLA-B27 negative.  Sedimentation rate normal.  Calcium elevated at 11 with normal up to 10.5.  CBC unremarkable.  Uric acid 5.1.  X-rays of the hands, feet, and neck showed degenerative changes.  X-rays of the left shoulder showed degenerative change.    I see no active synovitis today.  Pain is mainly in left shoulder.  Swollen joint count is: 0, Tender joint count is: 1, Physician global is: 0, VAS is: 8, Patient global is: 5  CDAI is 6-low disease Activity .  Labs were notable for normal sed rate and CRP.  There is a concern due to recent labs showing hypercalcemia.  I will check ionized calcium and intact PTH.  I reviewed labs with him.  I told him it is very important for him to get ocular exam as soon as possible.  I will refer to [facility].   I will get labs regarding and see in 3 months.   I will arrange shoulder injection.   Primary osteoarthritis of both hands  He has fairly severe osteoarthritis.  There is mild bony enlargement of the right second and third MCP.  Chronic pain of both shoulders  History of rotator cuff disease with repair on the right.  L is more painful. Arrange injection.   High risk medication use  Hydroxychloroquine.  I told him of the need for ocular exam ASAP.  Long term systemic steroid user  He has been on  5 mg for approximately 7 years.  I would eventually like to wean if he continues to show no synovitis.   Neck pain  He apparently had a congenital anomaly and a fracture of his neck at some point according to history.  X-ray showed OA.   Chronic fatigue  This remains a problem.  Hypercalcemia  Calcium 11 with normal up to 10.5.  I will check PTH and ionized calcium.  If these are elevated, we may need to send him for evaluation for hyperparathyroidism.    Orders Placed This Encounter   Procedures    PTH, Intact    Calcium, Ionized    Ambulatory Referral to Ophthalmology               Follow Up:   Return in about 3 months (around 3/5/2025).        Lucian Marina MD  [facility] [place]

## 2024-12-04 NOTE — ASSESSMENT & PLAN NOTE
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  X-ray showed OA.

## 2024-12-04 NOTE — ASSESSMENT & PLAN NOTE
He has been on 5 mg for approximately 7 years.  I would eventually like to wean if he continues to show no synovitis.

## 2024-12-04 NOTE — ASSESSMENT & PLAN NOTE
Diagnosed by Dr. Diallo: Ildefonso approximately 2017.  Treated in past with colchicine, methotrexate, and Plaquenil along with low-dose prednisone of 5 mg chronically.  Patient stopped methotrexate in 2020 and has been maintained on Plaquenil and 5 mg of prednisone by his primary care provider.  Rheumatoid factor negative.  CCP negative.  14.3.3 ETA negative.  CRP normal.  HLA-B27 negative.  Sedimentation rate normal.  Calcium elevated at 11 with normal up to 10.5.  CBC unremarkable.  Uric acid 5.1.  X-rays of the hands, feet, and neck showed degenerative changes.  X-rays of the left shoulder showed degenerative change.    I see no active synovitis today.  Pain is mainly in left shoulder.  Swollen joint count is: 0, Tender joint count is: 1, Physician global is: 0, VAS is: 8, Patient global is: 5  CDAI is 6-low disease Activity .  Labs were notable for normal sed rate and CRP.  There is a concern due to recent labs showing hypercalcemia.  I will check ionized calcium and intact PTH.  I reviewed labs with him.  I told him it is very important for him to get ocular exam as soon as possible.  I will refer to [facility].   I will get labs regarding and see in 3 months.   I will arrange shoulder injection.

## 2024-12-05 ENCOUNTER — LAB (OUTPATIENT)
Facility: HOSPITAL | Age: 59
End: 2024-12-05
Payer: COMMERCIAL

## 2024-12-05 ENCOUNTER — OFFICE VISIT (OUTPATIENT)
Age: 59
End: 2024-12-05
Payer: COMMERCIAL

## 2024-12-05 VITALS
SYSTOLIC BLOOD PRESSURE: 120 MMHG | HEIGHT: 67 IN | BODY MASS INDEX: 30.89 KG/M2 | HEART RATE: 60 BPM | DIASTOLIC BLOOD PRESSURE: 72 MMHG | WEIGHT: 196.8 LBS | TEMPERATURE: 97.7 F

## 2024-12-05 DIAGNOSIS — M54.2 NECK PAIN: ICD-10-CM

## 2024-12-05 DIAGNOSIS — M06.09 RHEUMATOID ARTHRITIS OF MULTIPLE SITES WITH NEGATIVE RHEUMATOID FACTOR: Primary | ICD-10-CM

## 2024-12-05 DIAGNOSIS — M25.512 CHRONIC PAIN OF BOTH SHOULDERS: ICD-10-CM

## 2024-12-05 DIAGNOSIS — Z79.52 LONG TERM SYSTEMIC STEROID USER: ICD-10-CM

## 2024-12-05 DIAGNOSIS — R53.82 CHRONIC FATIGUE: ICD-10-CM

## 2024-12-05 DIAGNOSIS — Z79.899 HIGH RISK MEDICATION USE: ICD-10-CM

## 2024-12-05 DIAGNOSIS — E83.52 HYPERCALCEMIA: ICD-10-CM

## 2024-12-05 DIAGNOSIS — M25.511 CHRONIC PAIN OF BOTH SHOULDERS: ICD-10-CM

## 2024-12-05 DIAGNOSIS — G89.29 CHRONIC PAIN OF BOTH SHOULDERS: ICD-10-CM

## 2024-12-05 DIAGNOSIS — M19.042 PRIMARY OSTEOARTHRITIS OF BOTH HANDS: ICD-10-CM

## 2024-12-05 DIAGNOSIS — M19.041 PRIMARY OSTEOARTHRITIS OF BOTH HANDS: ICD-10-CM

## 2024-12-05 PROCEDURE — 36415 COLL VENOUS BLD VENIPUNCTURE: CPT

## 2024-12-05 PROCEDURE — 82330 ASSAY OF CALCIUM: CPT

## 2024-12-05 PROCEDURE — 83970 ASSAY OF PARATHORMONE: CPT

## 2024-12-05 NOTE — ASSESSMENT & PLAN NOTE
Left subacromial bursateroid injection: 12/6/24    Consent discussed and signed  See procedure documentation and MAR

## 2024-12-06 ENCOUNTER — CLINICAL SUPPORT (OUTPATIENT)
Age: 59
End: 2024-12-06
Payer: COMMERCIAL

## 2024-12-06 VITALS
DIASTOLIC BLOOD PRESSURE: 72 MMHG | HEART RATE: 59 BPM | HEIGHT: 67 IN | TEMPERATURE: 97.5 F | BODY MASS INDEX: 30.2 KG/M2 | SYSTOLIC BLOOD PRESSURE: 122 MMHG | WEIGHT: 192.4 LBS

## 2024-12-06 DIAGNOSIS — G89.29 CHRONIC PAIN OF BOTH SHOULDERS: Primary | ICD-10-CM

## 2024-12-06 DIAGNOSIS — M25.511 CHRONIC PAIN OF BOTH SHOULDERS: Primary | ICD-10-CM

## 2024-12-06 DIAGNOSIS — M25.512 CHRONIC PAIN OF BOTH SHOULDERS: Primary | ICD-10-CM

## 2024-12-06 LAB
CA-I SERPL ISE-MCNC: 1.28 MMOL/L (ref 1.15–1.35)
PTH-INTACT SERPL-MCNC: 22.4 PG/ML (ref 15–65)

## 2024-12-06 RX ORDER — METHYLPREDNISOLONE ACETATE 80 MG/ML
80 INJECTION, SUSPENSION INTRA-ARTICULAR; INTRALESIONAL; INTRAMUSCULAR; SOFT TISSUE ONCE
Status: COMPLETED | OUTPATIENT
Start: 2024-12-06 | End: 2024-12-06

## 2024-12-06 RX ADMIN — METHYLPREDNISOLONE ACETATE 80 MG: 80 INJECTION, SUSPENSION INTRA-ARTICULAR; INTRALESIONAL; INTRAMUSCULAR; SOFT TISSUE at 11:19

## 2024-12-06 RX ADMIN — METHYLPREDNISOLONE ACETATE 80 MG: 80 INJECTION, SUSPENSION INTRA-ARTICULAR; INTRALESIONAL; INTRAMUSCULAR; SOFT TISSUE at 11:18

## 2025-01-03 ENCOUNTER — HOSPITAL ENCOUNTER (OUTPATIENT)
Facility: HOSPITAL | Age: 60
Discharge: HOME | End: 2025-01-03
Payer: MEDICAID

## 2025-01-03 LAB
25(OH)D3 SERPL-MCNC: 30.4 NG/ML (ref 32–100)
ALBUMIN SERPL-MCNC: 4.6 G/DL (ref 3.4–4.8)
ALBUMIN/GLOB SERPL: 1.9 {RATIO} (ref 0.8–2)
ALP SERPL-CCNC: 81 U/L (ref 25–100)
ALT SERPL-CCNC: 36 U/L (ref 4–36)
ANION GAP SERPL CALCULATED.3IONS-SCNC: 14 MMOL/L (ref 3–16)
AST SERPL-CCNC: 23 U/L (ref 8–33)
BASOPHILS # BLD: 0.1 K/UL (ref 0–0.1)
BASOPHILS NFR BLD: 0.8 %
BILIRUB SERPL-MCNC: 0.3 MG/DL (ref 0.3–1.2)
BILIRUB UR QL STRIP.AUTO: NEGATIVE
BUN SERPL-MCNC: 10 MG/DL (ref 6–20)
CALCIUM SERPL-MCNC: 9.4 MG/DL (ref 8.5–10.5)
CHLORIDE SERPL-SCNC: 104 MMOL/L (ref 98–107)
CHOLEST SERPL-MCNC: 134 MG/DL (ref 0–200)
CLARITY UR: CLEAR
CO2 SERPL-SCNC: 22 MMOL/L (ref 20–30)
COLOR UR: YELLOW
CREAT SERPL-MCNC: 0.8 MG/DL (ref 0.4–1.2)
EOSINOPHIL # BLD: 0.1 K/UL (ref 0–0.4)
EOSINOPHIL NFR BLD: 1.5 %
ERYTHROCYTE [DISTWIDTH] IN BLOOD BY AUTOMATED COUNT: 13.2 % (ref 11–16)
FOLATE SERPL-MCNC: 19.3 NG/ML
GFR SERPLBLD CREATININE-BSD FMLA CKD-EPI: >90 ML/MIN/{1.73_M2}
GLOBULIN SER CALC-MCNC: 2.4 G/DL
GLUCOSE SERPL-MCNC: 126 MG/DL (ref 74–106)
GLUCOSE UR STRIP.AUTO-MCNC: NEGATIVE MG/DL
HBA1C MFR BLD: 5.8 %
HCT VFR BLD AUTO: 39.8 % (ref 40–54)
HDLC SERPL-MCNC: 57 MG/DL (ref 40–60)
HGB BLD-MCNC: 13.6 G/DL (ref 13–18)
HGB UR QL STRIP.AUTO: NEGATIVE
IMM GRANULOCYTES # BLD: 0.1 K/UL
IMM GRANULOCYTES NFR BLD: 0.7 % (ref 0–5)
KETONES UR STRIP.AUTO-MCNC: NEGATIVE MG/DL
LDLC SERPL CALC-MCNC: 63 MG/DL
LEUKOCYTE ESTERASE UR QL STRIP.AUTO: NEGATIVE
LYMPHOCYTES # BLD: 1.9 K/UL (ref 1.5–4)
LYMPHOCYTES NFR BLD: 21.6 %
MCH RBC QN AUTO: 31.4 PG (ref 27–32)
MCHC RBC AUTO-ENTMCNC: 34.2 G/DL (ref 31–35)
MCV RBC AUTO: 91.9 FL (ref 80–100)
MONOCYTES # BLD: 0.6 K/UL (ref 0.2–0.8)
MONOCYTES NFR BLD: 6.7 %
NEUTROPHILS # BLD: 6.1 K/UL (ref 2–7.5)
NEUTS SEG NFR BLD: 68.7 %
NITRITE UR QL STRIP.AUTO: NEGATIVE
PH UR STRIP.AUTO: 6 [PH] (ref 5–8)
PLATELET # BLD AUTO: 310 K/UL (ref 150–400)
PMV BLD AUTO: 8.9 FL (ref 6–10)
POTASSIUM SERPL-SCNC: 4.2 MMOL/L (ref 3.4–5.1)
PROT SERPL-MCNC: 7 G/DL (ref 6.4–8.3)
PROT UR STRIP.AUTO-MCNC: NEGATIVE MG/DL
PSA SERPL DL<=0.01 NG/ML-MCNC: 0.63 NG/ML (ref 0–4)
RBC # BLD AUTO: 4.33 M/UL (ref 4.5–6)
SODIUM SERPL-SCNC: 140 MMOL/L (ref 136–145)
SP GR UR STRIP.AUTO: 1.01 (ref 1–1.03)
T4 FREE SERPL-MCNC: 1 NG/DL (ref 0.92–1.68)
TRIGL SERPL-MCNC: 68 MG/DL (ref 0–249)
TSH SERPL DL<=0.005 MIU/L-ACNC: 2.23 UIU/ML (ref 0.27–4.2)
UA COMPLETE W REFLEX CULTURE PNL UR: NORMAL
UA DIPSTICK W REFLEX MICRO PNL UR: NORMAL
URN SPEC COLLECT METH UR: NORMAL
UROBILINOGEN UR STRIP-ACNC: 0.2 E.U./DL
VIT B12 SERPL-MCNC: 516 PG/ML (ref 211–911)
VLDLC SERPL CALC-MCNC: 14 MG/DL
WBC # BLD AUTO: 8.9 K/UL (ref 4–11)

## 2025-01-03 PROCEDURE — 85025 COMPLETE CBC W/AUTO DIFF WBC: CPT

## 2025-01-03 PROCEDURE — 36415 COLL VENOUS BLD VENIPUNCTURE: CPT

## 2025-01-03 PROCEDURE — 83036 HEMOGLOBIN GLYCOSYLATED A1C: CPT

## 2025-01-03 PROCEDURE — 84439 ASSAY OF FREE THYROXINE: CPT

## 2025-01-03 PROCEDURE — 84153 ASSAY OF PSA TOTAL: CPT

## 2025-01-03 PROCEDURE — 82306 VITAMIN D 25 HYDROXY: CPT

## 2025-01-03 PROCEDURE — 80061 LIPID PANEL: CPT

## 2025-01-03 PROCEDURE — 80053 COMPREHEN METABOLIC PANEL: CPT

## 2025-01-03 PROCEDURE — 84481 FREE ASSAY (FT-3): CPT

## 2025-01-03 PROCEDURE — 82607 VITAMIN B-12: CPT

## 2025-01-03 PROCEDURE — 82746 ASSAY OF FOLIC ACID SERUM: CPT

## 2025-01-03 PROCEDURE — 81003 URINALYSIS AUTO W/O SCOPE: CPT

## 2025-01-03 PROCEDURE — 84443 ASSAY THYROID STIM HORMONE: CPT

## 2025-01-04 LAB — T3FREE SERPL-MCNC: 3.1 PG/ML (ref 2.3–4.2)

## 2025-01-27 ENCOUNTER — HOSPITAL ENCOUNTER (OUTPATIENT)
Facility: HOSPITAL | Age: 60
Discharge: HOME | End: 2025-01-27
Payer: MEDICAID

## 2025-01-27 ENCOUNTER — HOSPITAL ENCOUNTER (OUTPATIENT)
Dept: GENERAL RADIOLOGY | Facility: HOSPITAL | Age: 60
Discharge: HOME | End: 2025-01-27
Payer: MEDICAID

## 2025-01-27 ENCOUNTER — HOSPITAL ENCOUNTER (OUTPATIENT)
Dept: CT IMAGING | Facility: HOSPITAL | Age: 60
Discharge: HOME | End: 2025-01-27
Payer: MEDICAID

## 2025-01-27 DIAGNOSIS — N40.1 ENLARGED PROSTATE WITH URINARY OBSTRUCTION: ICD-10-CM

## 2025-01-27 DIAGNOSIS — J34.89 NASAL DISCHARGE: ICD-10-CM

## 2025-01-27 DIAGNOSIS — R97.20 ELEVATED PROSTATE SPECIFIC ANTIGEN (PSA): ICD-10-CM

## 2025-01-27 DIAGNOSIS — N13.8 ENLARGED PROSTATE WITH URINARY OBSTRUCTION: ICD-10-CM

## 2025-01-27 DIAGNOSIS — R31.1 BENIGN ESSENTIAL MICROSCOPIC HEMATURIA: ICD-10-CM

## 2025-01-27 PROCEDURE — 70486 CT MAXILLOFACIAL W/O DYE: CPT

## 2025-01-27 PROCEDURE — 74018 RADEX ABDOMEN 1 VIEW: CPT

## 2025-01-30 ENCOUNTER — TRANSCRIBE ORDERS (OUTPATIENT)
Dept: ADMINISTRATIVE | Facility: HOSPITAL | Age: 60
End: 2025-01-30
Payer: COMMERCIAL

## 2025-01-30 DIAGNOSIS — R60.0 LEG EDEMA: Primary | ICD-10-CM

## 2025-02-10 ENCOUNTER — HOSPITAL ENCOUNTER (OUTPATIENT)
Facility: HOSPITAL | Age: 60
Discharge: HOME | End: 2025-02-10
Payer: MEDICAID

## 2025-02-10 ENCOUNTER — HOSPITAL ENCOUNTER (OUTPATIENT)
Dept: GENERAL RADIOLOGY | Facility: HOSPITAL | Age: 60
Discharge: HOME | End: 2025-02-10
Payer: MEDICAID

## 2025-02-10 DIAGNOSIS — M25.551 RIGHT HIP PAIN: ICD-10-CM

## 2025-02-10 LAB
ALBUMIN SERPL-MCNC: 4.5 G/DL (ref 3.4–4.8)
ALP SERPL-CCNC: 65 U/L (ref 25–100)
ALT SERPL-CCNC: 31 U/L (ref 4–36)
ANION GAP SERPL CALCULATED.3IONS-SCNC: 14 MMOL/L (ref 3–16)
AST SERPL-CCNC: 25 U/L (ref 8–33)
BILIRUB DIRECT SERPL-MCNC: <0.1 MG/DL (ref 0–0.2)
BILIRUB INDIRECT SERPL-MCNC: 0.2 MG/DL (ref 0.2–0.8)
BILIRUB SERPL-MCNC: 0.3 MG/DL (ref 0.3–1.2)
BUN SERPL-MCNC: 21 MG/DL (ref 6–20)
CALCIUM SERPL-MCNC: 9.5 MG/DL (ref 8.3–10.6)
CHLORIDE SERPL-SCNC: 104 MMOL/L (ref 98–107)
CHOLEST SERPL-MCNC: 118 MG/DL (ref 0–200)
CO2 SERPL-SCNC: 21 MMOL/L (ref 20–30)
CREAT SERPL-MCNC: 1.1 MG/DL (ref 0.4–1.2)
ERYTHROCYTE [DISTWIDTH] IN BLOOD BY AUTOMATED COUNT: 12.8 % (ref 11–16)
GFR SERPLBLD CREATININE-BSD FMLA CKD-EPI: 77 ML/MIN/{1.73_M2}
GLUCOSE SERPL-MCNC: 114 MG/DL (ref 74–106)
HCT VFR BLD AUTO: 38.4 % (ref 40–54)
HDLC SERPL-MCNC: 41 MG/DL (ref 40–60)
HGB BLD-MCNC: 13 G/DL (ref 13–18)
LDLC SERPL CALC-MCNC: 64 MG/DL
MCH RBC QN AUTO: 31.1 PG (ref 27–32)
MCHC RBC AUTO-ENTMCNC: 33.9 G/DL (ref 31–35)
MCV RBC AUTO: 91.9 FL (ref 80–100)
NT-PROBNP SERPL-MCNC: 55 PG/ML (ref 0–1800)
PLATELET # BLD AUTO: 266 K/UL (ref 150–400)
PMV BLD AUTO: 9.1 FL (ref 6–10)
POTASSIUM SERPL-SCNC: 4.5 MMOL/L (ref 3.4–5.1)
PROT SERPL-MCNC: 7 G/DL (ref 6.4–8.3)
RBC # BLD AUTO: 4.18 M/UL (ref 4.5–6)
SODIUM SERPL-SCNC: 139 MMOL/L (ref 136–145)
TRIGL SERPL-MCNC: 64 MG/DL (ref 0–249)
VLDLC SERPL CALC-MCNC: 13 MG/DL
WBC # BLD AUTO: 8.8 K/UL (ref 4–11)

## 2025-02-10 PROCEDURE — 36415 COLL VENOUS BLD VENIPUNCTURE: CPT

## 2025-02-10 PROCEDURE — 80076 HEPATIC FUNCTION PANEL: CPT

## 2025-02-10 PROCEDURE — 73521 X-RAY EXAM HIPS BI 2 VIEWS: CPT

## 2025-02-10 PROCEDURE — 85027 COMPLETE CBC AUTOMATED: CPT

## 2025-02-10 PROCEDURE — 80048 BASIC METABOLIC PNL TOTAL CA: CPT

## 2025-02-10 PROCEDURE — 80061 LIPID PANEL: CPT

## 2025-02-10 PROCEDURE — 83880 ASSAY OF NATRIURETIC PEPTIDE: CPT

## 2025-02-14 ENCOUNTER — HOSPITAL ENCOUNTER (OUTPATIENT)
Facility: HOSPITAL | Age: 60
Discharge: HOME | End: 2025-02-14
Payer: COMMERCIAL

## 2025-02-14 VITALS — BODY MASS INDEX: 30.21 KG/M2 | HEIGHT: 67 IN | WEIGHT: 192.46 LBS

## 2025-02-14 DIAGNOSIS — R60.0 LEG EDEMA: ICD-10-CM

## 2025-02-14 PROCEDURE — 93970 EXTREMITY STUDY: CPT

## 2025-02-14 PROCEDURE — 93925 LOWER EXTREMITY STUDY: CPT

## 2025-02-17 LAB
BH CV GRAFT BRACHIAL PRESSURE LEFT: 130 MMHG
BH CV GRAFT BRACHIAL PRESSURE RIGHT: 131 MMHG
BH CV LEA LEFT ANT TIBIAL A DISTAL PSV: 83.4 CM/S
BH CV LEA LEFT CFA DISTAL PSV: 149 CM/S
BH CV LEA LEFT DFA PROX PSV: 77.1 CM/S
BH CV LEA LEFT DPA PRESSURE: 135 MMHG
BH CV LEA LEFT PERONEAL  MID PSV: 50.5 CM/S
BH CV LEA LEFT POPITEAL A  PROX PSV: 54.3 CM/S
BH CV LEA LEFT PTA DISTAL PSV: 56.5 CM/S
BH CV LEA LEFT PTA PRESSURE: 140 MMHG
BH CV LEA LEFT SFA DISTAL PSV: 84.1 CM/S
BH CV LEA LEFT SFA MID PSV: 102 CM/S
BH CV LEA LEFT SFA PROX PSV: 108 CM/S
BH CV LEA LEFT TIBEOPERONEAL PSV: 69.7 CM/S
BH CV LEA RIGHT ANT TIBIAL A DISTAL PSV: 78.3 CM/S
BH CV LEA RIGHT CFA DISTAL PSV: 97.2 CM/S
BH CV LEA RIGHT DFA PROX PSV: 43.9 CM/S
BH CV LEA RIGHT DPA PRESSURE: 155 MMHG
BH CV LEA RIGHT PERONEAL  MID PSV: 51.6 CM/S
BH CV LEA RIGHT POPITEAL A  PROX PSV: 50.5 CM/S
BH CV LEA RIGHT PTA DISTAL PSV: 65.9 CM/S
BH CV LEA RIGHT PTA PRESSURE: 150 MMHG
BH CV LEA RIGHT SFA DISTAL PSV: 107 CM/S
BH CV LEA RIGHT SFA MID PSV: 108 CM/S
BH CV LEA RIGHT SFA PROX PSV: 128 CM/S
BH CV LEA RIGHT TIBEOPERONEAL PSV: 92.6 CM/S
BH CV LOWER ARTERIAL LEFT ABI RATIO: 1.06
BH CV LOWER ARTERIAL RIGHT ABI RATIO: 1.14
BH CV LOWER VASCULAR LEFT COMMON FEMORAL AUGMENT: NORMAL
BH CV LOWER VASCULAR LEFT COMMON FEMORAL COMPRESS: NORMAL
BH CV LOWER VASCULAR LEFT COMMON FEMORAL PHASIC: NORMAL
BH CV LOWER VASCULAR LEFT COMMON FEMORAL SPONT: NORMAL
BH CV LOWER VASCULAR LEFT DISTAL FEMORAL AUGMENT: NORMAL
BH CV LOWER VASCULAR LEFT DISTAL FEMORAL COMPRESS: NORMAL
BH CV LOWER VASCULAR LEFT DISTAL FEMORAL PHASIC: NORMAL
BH CV LOWER VASCULAR LEFT DISTAL FEMORAL SPONT: NORMAL
BH CV LOWER VASCULAR LEFT GASTRONEMIUS COMPRESS: NORMAL
BH CV LOWER VASCULAR LEFT GREATER SAPH AK COMPRESS: NORMAL
BH CV LOWER VASCULAR LEFT GREATER SAPH BK COMPRESS: NORMAL
BH CV LOWER VASCULAR LEFT LESSER SAPH COMPRESS: NORMAL
BH CV LOWER VASCULAR LEFT MID FEMORAL AUGMENT: NORMAL
BH CV LOWER VASCULAR LEFT MID FEMORAL COMPRESS: NORMAL
BH CV LOWER VASCULAR LEFT MID FEMORAL PHASIC: NORMAL
BH CV LOWER VASCULAR LEFT MID FEMORAL SPONT: NORMAL
BH CV LOWER VASCULAR LEFT PERONEAL COMPRESS: NORMAL
BH CV LOWER VASCULAR LEFT POPLITEAL AUGMENT: NORMAL
BH CV LOWER VASCULAR LEFT POPLITEAL COMPRESS: NORMAL
BH CV LOWER VASCULAR LEFT POPLITEAL PHASIC: NORMAL
BH CV LOWER VASCULAR LEFT POPLITEAL SPONT: NORMAL
BH CV LOWER VASCULAR LEFT POSTERIOR TIBIAL COMPRESS: NORMAL
BH CV LOWER VASCULAR LEFT PROFUNDA FEMORAL COMPRESS: NORMAL
BH CV LOWER VASCULAR LEFT PROXIMAL FEMORAL AUGMENT: NORMAL
BH CV LOWER VASCULAR LEFT PROXIMAL FEMORAL COMPRESS: NORMAL
BH CV LOWER VASCULAR LEFT PROXIMAL FEMORAL PHASIC: NORMAL
BH CV LOWER VASCULAR LEFT PROXIMAL FEMORAL SPONT: NORMAL
BH CV LOWER VASCULAR LEFT SAPHENOFEMORAL JUNCTION COMPRESS: NORMAL
BH CV LOWER VASCULAR RIGHT COMMON FEMORAL AUGMENT: NORMAL
BH CV LOWER VASCULAR RIGHT COMMON FEMORAL COMPRESS: NORMAL
BH CV LOWER VASCULAR RIGHT COMMON FEMORAL PHASIC: NORMAL
BH CV LOWER VASCULAR RIGHT COMMON FEMORAL SPONT: NORMAL
BH CV LOWER VASCULAR RIGHT DISTAL FEMORAL AUGMENT: NORMAL
BH CV LOWER VASCULAR RIGHT DISTAL FEMORAL COMPRESS: NORMAL
BH CV LOWER VASCULAR RIGHT DISTAL FEMORAL PHASIC: NORMAL
BH CV LOWER VASCULAR RIGHT DISTAL FEMORAL SPONT: NORMAL
BH CV LOWER VASCULAR RIGHT GASTRONEMIUS COMPRESS: NORMAL
BH CV LOWER VASCULAR RIGHT GREATER SAPH AK COMPRESS: NORMAL
BH CV LOWER VASCULAR RIGHT GREATER SAPH BK COMPRESS: NORMAL
BH CV LOWER VASCULAR RIGHT LESSER SAPH COMPRESS: NORMAL
BH CV LOWER VASCULAR RIGHT MID FEMORAL AUGMENT: NORMAL
BH CV LOWER VASCULAR RIGHT MID FEMORAL COMPRESS: NORMAL
BH CV LOWER VASCULAR RIGHT MID FEMORAL PHASIC: NORMAL
BH CV LOWER VASCULAR RIGHT MID FEMORAL SPONT: NORMAL
BH CV LOWER VASCULAR RIGHT PERONEAL COMPRESS: NORMAL
BH CV LOWER VASCULAR RIGHT POPLITEAL AUGMENT: NORMAL
BH CV LOWER VASCULAR RIGHT POPLITEAL COMPRESS: NORMAL
BH CV LOWER VASCULAR RIGHT POPLITEAL PHASIC: NORMAL
BH CV LOWER VASCULAR RIGHT POPLITEAL SPONT: NORMAL
BH CV LOWER VASCULAR RIGHT POSTERIOR TIBIAL COMPRESS: NORMAL
BH CV LOWER VASCULAR RIGHT PROFUNDA FEMORAL COMPRESS: NORMAL
BH CV LOWER VASCULAR RIGHT PROXIMAL FEMORAL AUGMENT: NORMAL
BH CV LOWER VASCULAR RIGHT PROXIMAL FEMORAL COMPRESS: NORMAL
BH CV LOWER VASCULAR RIGHT PROXIMAL FEMORAL PHASIC: NORMAL
BH CV LOWER VASCULAR RIGHT PROXIMAL FEMORAL SPONT: NORMAL
BH CV LOWER VASCULAR RIGHT SAPHENOFEMORAL JUNCTION COMPRESS: NORMAL
LEFT GROIN CFA SYS: 149 CM/SEC
RIGHT GROIN CFA SYS: 97.2 CM/SEC

## 2025-02-18 ENCOUNTER — TRANSCRIBE ORDERS (OUTPATIENT)
Dept: GENERAL RADIOLOGY | Facility: HOSPITAL | Age: 60
End: 2025-02-18

## 2025-02-18 DIAGNOSIS — M25.551 PAIN IN RIGHT HIP: Primary | ICD-10-CM

## 2025-02-18 DIAGNOSIS — M54.50 LOW BACK PAIN WITHOUT SCIATICA, UNSPECIFIED BACK PAIN LATERALITY, UNSPECIFIED CHRONICITY: ICD-10-CM

## 2025-03-10 RX ORDER — FAMOTIDINE 40 MG/1
40 TABLET, FILM COATED ORAL EVERY EVENING
Qty: 30 TABLET | Refills: 11 | Status: SHIPPED | OUTPATIENT
Start: 2025-03-10 | End: 2026-03-10

## 2025-03-14 NOTE — ASSESSMENT & PLAN NOTE
Hx right shoulder surgery ~2020  Left subacromial bursateroid injection: 12/6/24  He continues to have left shoulder pain. Recent XR showed degenerative changes. He would like an ortho referral. I have placed the order for this today.

## 2025-03-14 NOTE — ASSESSMENT & PLAN NOTE
Diagnosed by Dr. Imani Fregoso approximately 2017.    Treated in past with colchicine, methotrexate, and Plaquenil along with low-dose prednisone of 5 mg chronically.  Patient stopped methotrexate in 2020 and was maintained on Plaquenil and 5 mg of prednisone by his primary care provider.  Rheumatoid factor negative.  CCP negative.  14.3.3 ETA negative.  CRP/ESR normal. HLA-B27 negative. Calcium elevated at 11 with normal up to 10.5.  CBC unremarkable.  Uric acid 5.1.    X-rays of the hands, feet, and neck showed degenerative changes.  X-rays of the left shoulder showed degenerative change.    No active synovitis today.  Pain is mainly in left shoulder.  Swollen joint count is: 0, Tender joint count is: 0, Physician global is: 0, VAS is: 6.5, Patient global is: 5.5  CDAI is 5.5-low disease Activity .  Prior labs showing hypercalcemia. 12/2024 ionized calcium and PTH normal.  Continue annual eye exams with [facility]. Most recent eye exam 1/2/25 was stable.  Recent labs 2/10/25 reviewed and stable  RTC 3 months

## 2025-03-14 NOTE — ASSESSMENT & PLAN NOTE
He has been on prednisone 5 mg for approximately 7 years.  I would eventually like to wean if he continues to show no synovitis.

## 2025-03-14 NOTE — ASSESSMENT & PLAN NOTE
He has fairly severe osteoarthritis.  There is mild bony enlargement of the right second and third MCP. He now reports nodule in left third tendon that causes him to have difficulty opening and closing his hand at times. Some triggering.    We will refer to hand ortho to consider injections

## 2025-03-14 NOTE — PROGRESS NOTES
Office Visit       Date: 03/20/2025   Patient Name: Buzz Cox  MRN: [id number]  YOB: 1965    Referring Physician: No ref. provider found     Chief Complaint: Rheumatoid Arthritis    History of Present Illness: Buzz Cox is a 59 y.o. male who is here today in follow-up for joint pain in a patient with a history of seronegative rheumatoid arthritis.  He had previously been followed by Dr. Imani Fregoso who made the diagnosis of seronegative rheumatoid arthritis.  He was initially treated with hydroxychloroquine with some relief.  He has been on long-term low-dose prednisone of 5 mg, for at least 7 years.  He was initially treated with colchicine without improvement.  He was on methotrexate until 2020 and he discontinued at that time due to the fear of COVID.      He has maintained on hydroxychloroquine 200 mg twice daily and prednisone 5 mg daily. He takes occasional over-the-counter ibuprofen.    He has morning stiffness lasting about 60 minutes.  Pain level is 6.5/10. Pain is mainly in L shoulder. He would like an orthopedic consult ordered today.   He takes baclofen as needed from other provider for chronic neck pain.  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  He had right shoulder surgery about 5 years ago with BGO.    He was having bilateral hip pain. XR right hip in interim showed arterial calcification. Hip pain has recently resolved.     He is having bilateral hand pain, especially in the second and third MCP, without swelling. He notes a nodule that is painful in his left hand. He notes this causes him to have a hard time opening and closing his hand.    He has noticed bony enlargement of the right first MTP.  He will occasionally have redness and swelling with this.  This usually last 2 to 3 days.  He has no previous history of gout or gout-like flares prior to this.    Since we last saw  him he had more stents placed in his heart.     Labs from 10/24 showed normal CRP and sedimentation rate, negative rheumatoid factor, CCP, and 14.3.3 ETA.  Negative HLA-B27.  Uric acid was 5.1.  CMP and CBC were remarkable only for elevated calcium at 11.  He had recent x-rays of his hands which showed osteoarthritis.  There was no mention in the report of chondrocalcinosis or erosions.  X-rays of the feet also showed degenerative changes with some hypertrophic changes.  X-rays of the left shoulder showed degenerative changes considered mild.  X-ray of the neck showed facet hypertrophy at C6/7.    Subjective   Review of Systems: Review of Systems   Musculoskeletal:  Positive for arthralgias and back pain.   All other systems reviewed and are negative.       Past Medical History:   Past Medical History:   Diagnosis Date    Arthritis     CAD (coronary artery disease)     Cancer     bladder- Dr Gore    Heart attack     Heart attack     High cholesterol     Hyperlipidemia     Hypertension     Myocardial infarction     Pain in joint of right shoulder     tight shoulder pain -Multiple partial tears of tendons-MRI 2018- Dr Monzon       Past Surgical History:   Past Surgical History:   Procedure Laterality Date    BLADDER TUMOR EXCISION      CARDIAC SURGERY      CORONARY ANGIOPLASTY WITH STENT PLACEMENT      ENDOSCOPY  02/06/2024    SHOULDER SURGERY Right 08/06/2019    s/p surgery on right -Dr Rosales. Has had biceps tendonitis on L- S/p injection Dr Rosales with relief       Family History:   Family History   Problem Relation Age of Onset    No Known Problems Mother     Other (carbon dioxide) Father     Other (brain tumor) Sister 33    Arthritis Sister        Social History:   Social History     Socioeconomic History    Marital status:     Number of children: 1   Tobacco Use    Smoking status: Former     Passive exposure: Past    Smokeless tobacco: Never   Vaping Use    Vaping status: Never Used   Substance and  Sexual Activity    Alcohol use: No    Drug use: No    Sexual activity: Defer       Medications:   Current Outpatient Medications:     aspirin 81 MG EC tablet, Take 1 tablet by mouth Daily., Disp: , Rfl:     atorvastatin (LIPITOR) 20 MG tablet, Take 1 tablet by mouth Daily., Disp: , Rfl:     baclofen (LIORESAL) 10 MG tablet, Take 1 tablet by mouth 3 times a day., Disp: , Rfl:     bisacodyl (Dulcolax) 5 MG EC tablet, Take 2 at 3pm and 2 at 7pm day prior to colonoscopy., Disp: 4 tablet, Rfl: 0    clopidogrel (PLAVIX) 75 MG tablet, TAKE 8 TABLETS BY MOUTH ON DAY 1 THEN TAKE 1 TABLET BY MOUTH EVERY DAY THEREAFTER, Disp: , Rfl:     cyclobenzaprine (FLEXERIL) 10 MG tablet, Take 1 tablet by mouth Every 12 (Twelve) Hours., Disp: , Rfl:     HYDROcodone-acetaminophen (NORCO)  MG per tablet, , Disp: , Rfl: 0    hydroxychloroquine (PLAQUENIL) 200 MG tablet, Take 1 tablet by mouth 2 (Two) Times a Day., Disp: 60 tablet, Rfl: 3    ibuprofen (ADVIL,MOTRIN) 400 MG tablet, , Disp: , Rfl: 0    lisinopril (PRINIVIL,ZESTRIL) 5 MG tablet, Take 1 tablet by mouth Daily., Disp: , Rfl:     metoprolol succinate XL (TOPROL-XL) 25 MG 24 hr tablet, Take 1 tablet by mouth Daily., Disp: , Rfl: 0    multivitamin with minerals (ONE DAILY FOR MEN 50+ ADVANCED PO), Take 1 tablet by mouth Daily., Disp: , Rfl:     predniSONE (DELTASONE) 5 MG tablet, Take 1 tablet by mouth Daily., Disp: 30 tablet, Rfl: 3    rosuvastatin (CRESTOR) 20 MG tablet, Take 1 tablet by mouth Daily., Disp: , Rfl:     Allergies:   Allergies   Allergen Reactions    Adhesive Tape Unknown - Low Severity       I have reviewed and updated the patient's chief complaint, history of present illness, review of systems, past medical history, surgical history, family history, social history, medications and allergy list as appropriate.     Objective    Vital Signs:   Vitals:    03/20/25 0910   BP: 122/74   BP Location: Left arm   Patient Position: Sitting   Cuff Size: Adult   Pulse: 57  "  Temp: 97.9 °F (36.6 °C)   Weight: 85.3 kg (188 lb)   Height: 170.2 cm (67.01\")   PainSc: 7    PainLoc: Generalized       Body mass index is 29.44 kg/m².   Defer to PCP    Physical Exam:  General: The patient is well-developed and well nourished. Cooperative, alert and oriented x3. Affect is normal. Hydration appears normal.   HEENT: Normocephalic and atraumatic.  Male pattern hair loss.  Lids and conjunctiva are normal. Pupils are equal and sclera are clear. Oropharynx is clear   NECK: Supple without adenopathy, masses or thyromegaly.   CARDIOVASCULAR: Regular rate and rhythm. No murmurs, rubs or gallops   LUNGS: Effort is normal. Lungs are clear bilaterally.  ABDOMEN: Soft and non-tender without masses or hepatosplenomegaly..   EXTREMITIES: No edema.  No cyanosis or clubbing.  SKIN: Inspection and palpation are normal.  NEUROLOGIC: Gait is normal.  Deep tendon reflexes are 2+ and symmetric.  MUSCULOSKELETAL: Complete joint exam was performed. There was full range of motion of the shoulders, elbows, wrists and hands without soft tissue swelling synovitis or deformities except as noted.  Right shoulder has full range of motion with mild crepitus but no pain.  Left shoulder has pain at 85 to 90 degrees abduction and slightly limited internal and external rotation.  Elbows are unremarkable without nodules.  Wrists have good range of motion.  There is squaring of the first CMC joint bilaterally.  The right second and third digit have slight bony enlargement.  I see no synovitis.  Heberden's and Linh's nodes are present.  I see no active synovitis.  Hips have good flexion and internal and external rotation.  Trochanters are slightly tender.  Knees have no palpable effusions.  There is full extension and flexion.  Ankles and feet have no soft tissue swelling or synovitis.  There is bony enlargement of the first MTP bilaterally with the right being much worse than the left.  Degenerative changes are seen in the toes. "  I see no synovitis.  BACK: Neck has limited range of motion.  There is mild kyphosis.  Joint Exam 03/20/2025     No joint exam has been documented for this visit       Patient Global: --  Provider Global: --  There is currently no information documented on the homunculus. Go to the Rheumatology activity and complete the homunculus joint exam.    Assessment / Plan        Assessment & Plan  Rheumatoid arthritis of multiple sites with negative rheumatoid factor  Diagnosed by Dr. Imani Fregoso approximately 2017.    Treated in past with colchicine, methotrexate, and Plaquenil along with low-dose prednisone of 5 mg chronically.  Patient stopped methotrexate in 2020 and was maintained on Plaquenil and 5 mg of prednisone by his primary care provider.  Rheumatoid factor negative.  CCP negative.  14.3.3 ETA negative.  CRP/ESR normal. HLA-B27 negative. Calcium elevated at 11 with normal up to 10.5.  CBC unremarkable.  Uric acid 5.1.    X-rays of the hands, feet, and neck showed degenerative changes.  X-rays of the left shoulder showed degenerative change.    No active synovitis today.  Pain is mainly in left shoulder.  Swollen joint count is: 0, Tender joint count is: 0, Physician global is: 0, VAS is: 6.5, Patient global is: 5.5  CDAI is 5.5-low disease Activity .  Prior labs showing hypercalcemia. 12/2024 ionized calcium and PTH normal.  Continue annual eye exams with [facility]. Most recent eye exam 1/2/25 was stable.  Recent labs 2/10/25 reviewed and stable  RTC 3 months  Primary osteoarthritis of both hands  He has fairly severe osteoarthritis.  There is mild bony enlargement of the right second and third MCP. He now reports nodule in left third tendon that causes him to have difficulty opening and closing his hand at times. Some triggering.    We will refer to hand ortho to consider injections  Neck pain  He apparently had a congenital anomaly and a fracture of his neck at some point according to history.  X-ray of  the neck showed facet hypertrophy at C6/7.    Continue baclofen from other provider  He takes ibuprofen as needed. Recommend he limit/avoid NSAIDs with CAD history.  Long term systemic steroid user  He has been on prednisone 5 mg for approximately 7 years.  I would eventually like to wean if he continues to show no synovitis.   High risk medication use  Hydroxychloroquine.  He had eye exam with [facility] 1/2025 that was stable  Chronic pain of both shoulders  Hx right shoulder surgery ~2020  Left subacromial bursateroid injection: 12/6/24  He continues to have left shoulder pain. Recent XR showed degenerative changes. He would like an ortho referral. I have placed the order for this today.  Chronic fatigue  This remains a problem.  Continue follow up with PCP, cardiology    Follow Up:   Return in about 3 months (around 6/20/2025) for Dr. Marina.        AHSAN Bond  [facility]

## 2025-03-14 NOTE — ASSESSMENT & PLAN NOTE
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  X-ray of the neck showed facet hypertrophy at C6/7.    Continue baclofen from other provider  He takes ibuprofen as needed. Recommend he limit/avoid NSAIDs with CAD history.

## 2025-03-20 ENCOUNTER — OFFICE VISIT (OUTPATIENT)
Age: 60
End: 2025-03-20
Payer: COMMERCIAL

## 2025-03-20 VITALS
SYSTOLIC BLOOD PRESSURE: 122 MMHG | TEMPERATURE: 97.9 F | BODY MASS INDEX: 29.51 KG/M2 | HEIGHT: 67 IN | HEART RATE: 57 BPM | WEIGHT: 188 LBS | DIASTOLIC BLOOD PRESSURE: 74 MMHG

## 2025-03-20 DIAGNOSIS — M25.512 CHRONIC PAIN OF BOTH SHOULDERS: ICD-10-CM

## 2025-03-20 DIAGNOSIS — R53.82 CHRONIC FATIGUE: ICD-10-CM

## 2025-03-20 DIAGNOSIS — M19.041 PRIMARY OSTEOARTHRITIS OF BOTH HANDS: ICD-10-CM

## 2025-03-20 DIAGNOSIS — Z79.899 HIGH RISK MEDICATION USE: ICD-10-CM

## 2025-03-20 DIAGNOSIS — M19.042 PRIMARY OSTEOARTHRITIS OF BOTH HANDS: ICD-10-CM

## 2025-03-20 DIAGNOSIS — M06.09 RHEUMATOID ARTHRITIS OF MULTIPLE SITES WITH NEGATIVE RHEUMATOID FACTOR: Primary | ICD-10-CM

## 2025-03-20 DIAGNOSIS — G89.29 CHRONIC PAIN OF BOTH SHOULDERS: ICD-10-CM

## 2025-03-20 DIAGNOSIS — Z79.52 LONG TERM SYSTEMIC STEROID USER: ICD-10-CM

## 2025-03-20 DIAGNOSIS — M25.511 CHRONIC PAIN OF BOTH SHOULDERS: ICD-10-CM

## 2025-03-20 DIAGNOSIS — M54.2 NECK PAIN: ICD-10-CM

## 2025-03-20 PROBLEM — I25.10 CAD (CORONARY ARTERY DISEASE): Status: ACTIVE | Noted: 2024-12-27

## 2025-03-20 PROCEDURE — 99214 OFFICE O/P EST MOD 30 MIN: CPT | Performed by: NURSE PRACTITIONER

## 2025-03-20 PROCEDURE — 1159F MED LIST DOCD IN RCRD: CPT | Performed by: NURSE PRACTITIONER

## 2025-03-20 PROCEDURE — 1160F RVW MEDS BY RX/DR IN RCRD: CPT | Performed by: NURSE PRACTITIONER

## 2025-03-20 RX ORDER — PREDNISONE 5 MG/1
5 TABLET ORAL DAILY
Qty: 90 TABLET | Refills: 1 | Status: CANCELLED | OUTPATIENT
Start: 2025-03-20

## 2025-03-20 RX ORDER — CYCLOBENZAPRINE HCL 10 MG
1 TABLET ORAL EVERY 12 HOURS SCHEDULED
COMMUNITY
Start: 2025-01-30

## 2025-03-20 RX ORDER — HYDROXYCHLOROQUINE SULFATE 200 MG/1
200 TABLET, FILM COATED ORAL 2 TIMES DAILY
Qty: 60 TABLET | Refills: 3 | Status: SHIPPED | OUTPATIENT
Start: 2025-03-20

## 2025-03-20 RX ORDER — HYDROXYCHLOROQUINE SULFATE 200 MG/1
200 TABLET, FILM COATED ORAL 2 TIMES DAILY
Qty: 60 TABLET | Refills: 3 | Status: CANCELLED | OUTPATIENT
Start: 2025-03-20

## 2025-03-20 RX ORDER — CLOPIDOGREL BISULFATE 75 MG/1
TABLET ORAL
COMMUNITY
Start: 2025-01-14

## 2025-03-20 RX ORDER — DIPHENOXYLATE HYDROCHLORIDE AND ATROPINE SULFATE 2.5; .025 MG/1; MG/1
1 TABLET ORAL DAILY
COMMUNITY
End: 2025-03-20

## 2025-03-20 RX ORDER — PREDNISONE 5 MG/1
5 TABLET ORAL DAILY
Qty: 30 TABLET | Refills: 3 | Status: SHIPPED | OUTPATIENT
Start: 2025-03-20

## 2025-04-01 ENCOUNTER — OFFICE VISIT (OUTPATIENT)
Dept: ORTHOPEDIC SURGERY | Facility: CLINIC | Age: 60
End: 2025-04-01
Payer: COMMERCIAL

## 2025-04-01 VITALS
HEIGHT: 67 IN | DIASTOLIC BLOOD PRESSURE: 80 MMHG | BODY MASS INDEX: 29.51 KG/M2 | WEIGHT: 188 LBS | SYSTOLIC BLOOD PRESSURE: 140 MMHG

## 2025-04-01 DIAGNOSIS — S49.92XA INJURY OF LEFT SHOULDER, INITIAL ENCOUNTER: ICD-10-CM

## 2025-04-01 DIAGNOSIS — M06.09 RHEUMATOID ARTHRITIS OF MULTIPLE SITES WITH NEGATIVE RHEUMATOID FACTOR: ICD-10-CM

## 2025-04-01 DIAGNOSIS — M19.012 PRIMARY OSTEOARTHRITIS OF LEFT SHOULDER: Primary | ICD-10-CM

## 2025-04-01 DIAGNOSIS — M75.102 ROTATOR CUFF SYNDROME OF LEFT SHOULDER: ICD-10-CM

## 2025-04-01 NOTE — PROGRESS NOTES
[facility] [place] [facility] Note        Subjective     Pain and Initial Evaluation of the Left Shoulder and Pain and Initial Evaluation of the Right Shoulder      HPI    Buzz Cox is a 59 y.o. male.  Patient is here today with his wife for evaluation of a left shoulder injury.  Referred by Dr. Paulino Marina.  Patient tells me he was up in a barn bringing down hay and he got a heavy bale and this jerked his left shoulder downward.  This was around January 2025.  He is right-hand dominant.  Since the event, he says whenever he coughs or sneezes, he feels like his left shoulder comes out of socket.  He was injected by Dr. Marina which helped him for a while.  He has pain at rest.  He has pain reaching behind him.  He has had prior right shoulder surgery by Dr. Kris Rosales at [facility] and did well from that.  He has no pain in the biceps muscle belly.        Past Medical History:   Diagnosis Date    Arthritis     CAD (coronary artery disease)     Cancer     bladder- Dr Gore    Heart attack     Heart attack     High cholesterol     Hyperlipidemia     Hypertension     Myocardial infarction     Pain in joint of right shoulder     tight shoulder pain -Multiple partial tears of tendons-MRI 2018- Dr Monzon      Past Surgical History:   Procedure Laterality Date    BLADDER TUMOR EXCISION      CARDIAC SURGERY      CORONARY ANGIOPLASTY WITH STENT PLACEMENT      ENDOSCOPY  02/06/2024    SHOULDER SURGERY Right 08/06/2019    s/p surgery on right -Dr Rosales. Has had biceps tendonitis on L- S/p injection Dr Rosales with relief      Family History   Problem Relation Age of Onset    No Known Problems Mother     Other (carbon dioxide) Father     Other (brain tumor) Sister 33    Arthritis Sister      Social History     Socioeconomic History    Marital status:     Number of children: 1   Tobacco Use    Smoking status: Former     Passive exposure: Past    Smokeless tobacco: Never   Vaping Use    Vaping status: Never Used    Substance and Sexual Activity    Alcohol use: No    Drug use: No    Sexual activity: Defer      Current Outpatient Medications on File Prior to Visit   Medication Sig Dispense Refill    aspirin 81 MG EC tablet Take 1 tablet by mouth Daily.      atorvastatin (LIPITOR) 20 MG tablet Take 1 tablet by mouth Daily.      baclofen (LIORESAL) 10 MG tablet Take 1 tablet by mouth 3 times a day.      bisacodyl (Dulcolax) 5 MG EC tablet Take 2 at 3pm and 2 at 7pm day prior to colonoscopy. 4 tablet 0    clopidogrel (PLAVIX) 75 MG tablet TAKE 8 TABLETS BY MOUTH ON DAY 1 THEN TAKE 1 TABLET BY MOUTH EVERY DAY THEREAFTER      cyclobenzaprine (FLEXERIL) 10 MG tablet Take 1 tablet by mouth Every 12 (Twelve) Hours.      hydroxychloroquine (PLAQUENIL) 200 MG tablet Take 1 tablet by mouth 2 (Two) Times a Day. 60 tablet 3    ibuprofen (ADVIL,MOTRIN) 400 MG tablet   0    lisinopril (PRINIVIL,ZESTRIL) 5 MG tablet Take 1 tablet by mouth Daily.      metoprolol succinate XL (TOPROL-XL) 25 MG 24 hr tablet Take 1 tablet by mouth Daily.  0    multivitamin with minerals (ONE DAILY FOR MEN 50+ ADVANCED PO) Take 1 tablet by mouth Daily.      rosuvastatin (CRESTOR) 20 MG tablet Take 1 tablet by mouth Daily.      HYDROcodone-acetaminophen (NORCO)  MG per tablet  (Patient not taking: Reported on 4/1/2025)  0    predniSONE (DELTASONE) 5 MG tablet Take 1 tablet by mouth Daily. (Patient not taking: Reported on 4/1/2025) 30 tablet 3     No current facility-administered medications on file prior to visit.      Allergies   Allergen Reactions    Adhesive Tape Unknown - Low Severity          Review of Systems   Constitutional: Negative.    HENT: Negative.     Eyes: Negative.    Respiratory: Negative.     Cardiovascular: Negative.    Gastrointestinal: Negative.    Endocrine: Negative.    Genitourinary: Negative.    Musculoskeletal: Negative.    Skin: Negative.    Allergic/Immunologic: Negative.    Neurological: Negative.    Hematological: Negative.   "  Psychiatric/Behavioral: Negative.          I reviewed the patient's chief complaint, history of present illness, review of systems, past medical history, surgical history, family history, social history, medications and allergy list.        Objective      Physical Exam  /80   Ht 170.2 cm (67.01\")   Wt 85.3 kg (188 lb)   BMI 29.44 kg/m²     Body mass index is 29.44 kg/m².    General  Mental Status - alert  General Appearance - cooperative, well groomed, not in acute distress  Orientation - Oriented X3  Build & Nutrition - well developed and well nourished  Posture - normal posture  Gait - normal gait       Ortho Exam  Musculoskeletal   Upper Extremity   Left Shoulder     Inspection and Palpation:     Medial Border Scapular Tenderness-none    AC Joint Tenderness -none    Sensation is normal    Examination reveals no ecchymosis.        Strength and Tone:    Supraspinatus -4-5 with pain    External Rotators-5/5 with pain    Infraspinatus - 5/5    Subscapularis - 5/5 with pain    Deltoid - 5/5     Range of Motion      Left Shoulder:    Internal Rotation: ROM - L4    External Rotation: AROM - 60 degrees    Elevation through flexion: AROM - 140 degrees        Impingement   Left shoulder    Gallego-Gregg impingement test positive    Neer impingement test negative     Functional Testing   Left shoulder    AC crossover adduction test negative    Speeds test negative    Uppercut test negative    O'Briens test negative    Drop arm sign negative    Apprehension relocation negative      Imaging/Studies Reviewed and Interpreted:  Imaging Results (Last 24 Hours)       ** No results found for the last 24 hours. **          We have reviewed and interpreted an x-ray of the patient's left shoulder from October 2024 which shows AC joint arthritis and some glenohumeral arthritis with some sclerosis of the glenoid    Assessment    Assessment:  1. Primary osteoarthritis of left shoulder    2. Rheumatoid arthritis of multiple " sites with negative rheumatoid factor    3. Injury of left shoulder, initial encounter    4. Rotator cuff syndrome of left shoulder        Plan:  Continue over-the-counter medication as needed for discomfort  59-year-old right-hand-dominant male with RA and degenerative changes of the glenohumeral joint who has sustained an injury to his left shoulder.  Concern is for rotator cuff injury.  MRI of the patient's left shoulder will be requested.  We will pay close attention to the amount of arthritis within the shoulder in addition to the anterior rotator cuff structures and biceps.  See him back to review that study and go from there.        Mati Bowie MD  04/01/25  09:51 EDT      Dictated Utilizing Dragon Dictation.

## 2025-04-11 ENCOUNTER — HOSPITAL ENCOUNTER (OUTPATIENT)
Dept: MRI IMAGING | Facility: HOSPITAL | Age: 60
Discharge: HOME | End: 2025-04-11
Payer: COMMERCIAL

## 2025-04-11 DIAGNOSIS — M75.102 ROTATOR CUFF SYNDROME OF LEFT SHOULDER: ICD-10-CM

## 2025-04-11 DIAGNOSIS — S49.92XA INJURY OF LEFT SHOULDER, INITIAL ENCOUNTER: ICD-10-CM

## 2025-04-11 PROCEDURE — 73221 MRI JOINT UPR EXTREM W/O DYE: CPT

## 2025-04-14 ENCOUNTER — TELEPHONE (OUTPATIENT)
Dept: ORTHOPEDIC SURGERY | Facility: CLINIC | Age: 60
End: 2025-04-14
Payer: COMMERCIAL

## 2025-04-14 NOTE — TELEPHONE ENCOUNTER
"Relay     \"PLEASE SCHEDULE FOLLOW UP APPOINTMENT WITH DR DOMINGUEZ TO GO OVER MRI RESULT\"                "

## 2025-04-23 ENCOUNTER — HOSPITAL ENCOUNTER (OUTPATIENT)
Facility: HOSPITAL | Age: 60
Discharge: HOME | End: 2025-04-23
Payer: MEDICAID

## 2025-04-23 ENCOUNTER — HOSPITAL ENCOUNTER (OUTPATIENT)
Dept: GENERAL RADIOLOGY | Facility: HOSPITAL | Age: 60
Discharge: HOME | End: 2025-04-23
Payer: MEDICAID

## 2025-04-23 DIAGNOSIS — R31.9 HEMATURIA SYNDROME: ICD-10-CM

## 2025-04-23 LAB
25(OH)D3 SERPL-MCNC: 33.5 NG/ML (ref 32–100)
ALBUMIN SERPL-MCNC: 4.3 G/DL (ref 3.4–4.8)
ALBUMIN/GLOB SERPL: 1.8 {RATIO} (ref 0.8–2)
ALP SERPL-CCNC: 77 U/L (ref 25–100)
ALT SERPL-CCNC: 23 U/L (ref 4–36)
ANION GAP SERPL CALCULATED.3IONS-SCNC: 11 MMOL/L (ref 3–16)
AST SERPL-CCNC: 21 U/L (ref 8–33)
BASOPHILS # BLD: 0.1 K/UL (ref 0–0.1)
BASOPHILS NFR BLD: 1 %
BILIRUB SERPL-MCNC: 0.3 MG/DL (ref 0.3–1.2)
BILIRUB UR QL STRIP.AUTO: NEGATIVE
BUN SERPL-MCNC: 11 MG/DL (ref 6–20)
CALCIUM SERPL-MCNC: 9.4 MG/DL (ref 8.3–10.6)
CHLORIDE SERPL-SCNC: 105 MMOL/L (ref 98–107)
CHOLEST SERPL-MCNC: 168 MG/DL (ref 0–200)
CLARITY UR: CLEAR
CO2 SERPL-SCNC: 23 MMOL/L (ref 20–30)
COLOR UR: YELLOW
CREAT SERPL-MCNC: 0.8 MG/DL (ref 0.4–1.2)
EOSINOPHIL # BLD: 0.1 K/UL (ref 0–0.4)
EOSINOPHIL NFR BLD: 2.3 %
ERYTHROCYTE [DISTWIDTH] IN BLOOD BY AUTOMATED COUNT: 12.7 % (ref 11–16)
GFR SERPLBLD CREATININE-BSD FMLA CKD-EPI: >90 ML/MIN/{1.73_M2}
GLOBULIN SER CALC-MCNC: 2.4 G/DL
GLUCOSE SERPL-MCNC: 120 MG/DL (ref 74–106)
GLUCOSE UR STRIP.AUTO-MCNC: NEGATIVE MG/DL
HBA1C MFR BLD: 5.7 %
HCT VFR BLD AUTO: 38.7 % (ref 40–54)
HDLC SERPL-MCNC: 37 MG/DL (ref 40–60)
HGB BLD-MCNC: 13.3 G/DL (ref 13–18)
HGB UR QL STRIP.AUTO: NEGATIVE
IMM GRANULOCYTES # BLD: 0 K/UL
IMM GRANULOCYTES NFR BLD: 0.3 % (ref 0–5)
KETONES UR STRIP.AUTO-MCNC: NEGATIVE MG/DL
LDLC SERPL CALC-MCNC: 109 MG/DL
LEUKOCYTE ESTERASE UR QL STRIP.AUTO: NEGATIVE
LYMPHOCYTES # BLD: 1.8 K/UL (ref 1.5–4)
LYMPHOCYTES NFR BLD: 30.1 %
MCH RBC QN AUTO: 30.9 PG (ref 27–32)
MCHC RBC AUTO-ENTMCNC: 34.4 G/DL (ref 31–35)
MCV RBC AUTO: 89.8 FL (ref 80–100)
MONOCYTES # BLD: 0.7 K/UL (ref 0.2–0.8)
MONOCYTES NFR BLD: 11.6 %
NEUTROPHILS # BLD: 3.4 K/UL (ref 2–7.5)
NEUTS SEG NFR BLD: 54.7 %
NITRITE UR QL STRIP.AUTO: NEGATIVE
PH UR STRIP.AUTO: 5.5 [PH] (ref 5–8)
PLATELET # BLD AUTO: 261 K/UL (ref 150–400)
PMV BLD AUTO: 9.3 FL (ref 6–10)
POTASSIUM SERPL-SCNC: 4.2 MMOL/L (ref 3.4–5.1)
PROT SERPL-MCNC: 6.7 G/DL (ref 6.4–8.3)
PROT UR STRIP.AUTO-MCNC: NEGATIVE MG/DL
PSA SERPL DL<=0.01 NG/ML-MCNC: 0.48 NG/ML (ref 0–4)
RBC # BLD AUTO: 4.31 M/UL (ref 4.5–6)
SODIUM SERPL-SCNC: 139 MMOL/L (ref 136–145)
SP GR UR STRIP.AUTO: <=1.005 (ref 1–1.03)
TRIGL SERPL-MCNC: 110 MG/DL (ref 0–249)
TSH SERPL DL<=0.005 MIU/L-ACNC: 3.48 UIU/ML (ref 0.27–4.2)
UA COMPLETE W REFLEX CULTURE PNL UR: NORMAL
UA DIPSTICK W REFLEX MICRO PNL UR: NORMAL
URN SPEC COLLECT METH UR: NORMAL
UROBILINOGEN UR STRIP-ACNC: 0.2 E.U./DL
VIT B12 SERPL-MCNC: 400 PG/ML (ref 211–911)
VLDLC SERPL CALC-MCNC: 22 MG/DL
WBC # BLD AUTO: 6.1 K/UL (ref 4–11)

## 2025-04-23 PROCEDURE — 74018 RADEX ABDOMEN 1 VIEW: CPT

## 2025-04-23 PROCEDURE — 83036 HEMOGLOBIN GLYCOSYLATED A1C: CPT

## 2025-04-23 PROCEDURE — 85025 COMPLETE CBC W/AUTO DIFF WBC: CPT

## 2025-04-23 PROCEDURE — 84443 ASSAY THYROID STIM HORMONE: CPT

## 2025-04-23 PROCEDURE — 36415 COLL VENOUS BLD VENIPUNCTURE: CPT

## 2025-04-23 PROCEDURE — 82607 VITAMIN B-12: CPT

## 2025-04-23 PROCEDURE — 81003 URINALYSIS AUTO W/O SCOPE: CPT

## 2025-04-23 PROCEDURE — 82306 VITAMIN D 25 HYDROXY: CPT

## 2025-04-23 PROCEDURE — 84153 ASSAY OF PSA TOTAL: CPT

## 2025-04-23 PROCEDURE — 80053 COMPREHEN METABOLIC PANEL: CPT

## 2025-04-23 PROCEDURE — 80061 LIPID PANEL: CPT

## 2025-06-09 RX ORDER — LISINOPRIL 5 MG/1
5 TABLET ORAL DAILY
Qty: 90 TABLET | Refills: 1 | Status: SHIPPED | OUTPATIENT
Start: 2025-06-09

## 2025-06-09 NOTE — TELEPHONE ENCOUNTER
Rx Refill Note  Requested Prescriptions     Pending Prescriptions Disp Refills    lisinopril (PRINIVIL,ZESTRIL) 5 MG tablet 90 tablet 1     Sig: Take 1 tablet by mouth Daily.      Last office visit with prescribing clinician: 01/25/25  Last telemedicine visit with prescribing clinician: Visit date not found   Next office visit with prescribing clinician: Visit date not found                        Pharmacy Info    Last Fill Date:  Rx Written Date:   Prescribed Qty:   Additional Details from Pharmacy:    Patient passed protocols per param.         Breonna Gutierrez MA  06/09/25, 15:44 EDT

## 2025-06-13 PROBLEM — E78.5 HYPERLIPIDEMIA LDL GOAL <70: Status: ACTIVE | Noted: 2025-06-13

## 2025-06-13 PROBLEM — I10 BENIGN ESSENTIAL HYPERTENSION: Status: ACTIVE | Noted: 2025-06-13

## 2025-06-13 NOTE — ASSESSMENT & PLAN NOTE
Coronary Artery Disease (OPTIONAL): Coronary artery disease is stable.  Continue current treatment regimen. Dietary sodium restriction. Weight loss.  Cardiac status will be reassessed in 3 months.  We will discontinue aspirin leave patient on Plavix 75 mg once a day  Orders:    CT Abdomen Pelvis Without Contrast; Future

## 2025-06-13 NOTE — PROGRESS NOTES
Cardiology Follow-Up Note     Name: Buzz Cox  :   1965  PCP: Beata Lou, APRN  Date:   2025  Department: [facility] [place] [facility] CARDIOLOGY  [address]  [facility] [address]  Fax [phone]  Phone [phone]    Chief Complaint   Patient presents with    Coronary Artery Disease    Hyperlipidemia    Hypertension     Problem list:  Coronary artery disease  2011 non-ST elevation myocardial infarction: EF 40% inferior wall hypokinesis Pronto thrombectomy and successful stents Xience drug-eluting stent x 2 to the RCA.  2024 class IV angina.  EF 65% by left heart cath.  Multivessel CAD with successful PTCA and stenting of the RCA.  2023 echocardiogram EF 60 to 65%.  Trace mitral and tricuspid regurgitation  Hypertension benign essential  Hypercholesterolemia  Obstructive sleep apnea  Immitis swelling and discomfort  2025 arterial duplex examination revealed no significant stenosis.  2025.  Stable examination revealed no DVT.  Incidental finding of lymph node in the groin 0.8 X1.0 cm                  Subjective     History of Present Illness  Buzz Cox is a 59 y.o. male who presents today for follow-up on chronic condition.  The patient states he has been having bruising all over.  He is working in the [employer] and in the yard.    Past Medical History:   Diagnosis Date    Arthritis     CAD (coronary artery disease)     Cancer     bladder- Dr Gore    Coronary atherosclerosis of native coronary vessel     Gastroesophageal reflux     Heart attack     Heart attack     High cholesterol     Hyperlipidemia     Hypertension     Myocardial infarction     Obstructive sleep apnea     Pain in joint of right shoulder     tight shoulder pain -Multiple partial tears of tendons-MRI 2018- Dr Monzon    Pulmonic valve insufficiency     Tricuspid regurgitation       Past Surgical History:   Procedure Laterality Date     BLADDER TUMOR EXCISION      CARDIAC CATHETERIZATION  12/26/2024    [facility]. IOP: CAD, HX Stenting, Class 4 Angina. EF 65%.  Multi CAD w/ Stenosis of RCA and LCA. Successful PTCA and  stenting to LCA and RCA. Preserved LV function.    CARDIAC CATHETERIZATION  05/23/2011    EF 40%, inf wall hypokenesis. Pronto thrombectomy and Xience  x 2 to RCA.    CARDIAC SURGERY      CORONARY ANGIOPLASTY WITH STENT PLACEMENT      ENDOSCOPY  02/06/2024    FINGER SURGERY  2002    Dr. Diallo    SHOULDER SURGERY Right 08/06/2019    s/p surgery on right -Dr Rosales. Has had biceps tendonitis on L- S/p injection Dr Rosales with relief    SINUS SURGERY         Current Outpatient Medications:     aspirin 81 MG EC tablet, Take 1 tablet by mouth Daily., Disp: , Rfl:     atorvastatin (LIPITOR) 20 MG tablet, Take 1 tablet by mouth Daily., Disp: , Rfl:     baclofen (LIORESAL) 10 MG tablet, Take 1 tablet by mouth 3 times a day., Disp: , Rfl:     clopidogrel (PLAVIX) 75 MG tablet, TAKE 8 TABLETS BY MOUTH ON DAY 1 THEN TAKE 1 TABLET BY MOUTH EVERY DAY THEREAFTER (Patient taking differently: Take 1 tablet by mouth Daily.), Disp: , Rfl:     diazePAM (VALIUM) 10 MG tablet, Take 1 tablet by mouth 1 time. Every 3 months prior to procedure, Disp: , Rfl:     famotidine (PEPCID) 20 MG tablet, Take 1 tablet by mouth 2 (Two) Times a Day., Disp: , Rfl:     finasteride (PROSCAR) 5 MG tablet, Take 1 tablet by mouth Daily., Disp: , Rfl:     hydroxychloroquine (PLAQUENIL) 200 MG tablet, Take 1 tablet by mouth 2 (Two) Times a Day., Disp: 60 tablet, Rfl: 3    ibuprofen (ADVIL,MOTRIN) 400 MG tablet, , Disp: , Rfl: 0    lisinopril (PRINIVIL,ZESTRIL) 5 MG tablet, Take 1 tablet by mouth Daily., Disp: 90 tablet, Rfl: 1    metoprolol succinate XL (TOPROL-XL) 25 MG 24 hr tablet, Take 1 tablet by mouth Daily., Disp: , Rfl: 0    multivitamin with minerals (ONE DAILY FOR MEN 50+ ADVANCED PO), Take 1 tablet by mouth Daily., Disp: , Rfl:     oxyCODONE-acetaminophen  "(PERCOCET)  MG per tablet, Take 1 tablet by mouth Every 6 (Six) Hours., Disp: , Rfl:     predniSONE (DELTASONE) 5 MG tablet, Take 1 tablet by mouth Daily., Disp: , Rfl:     rosuvastatin (CRESTOR) 20 MG tablet, Take 1 tablet by mouth Daily., Disp: , Rfl:     Objective     Vital Signs:  /76 (BP Location: Left arm, Patient Position: Sitting)   Pulse 56   Ht 170.2 cm (67\")   Wt 85.6 kg (188 lb 11.2 oz)   BMI 29.55 kg/m²   Estimated body mass index is 29.55 kg/m² as calculated from the following:    Height as of this encounter: 170.2 cm (67\").    Weight as of this encounter: 85.6 kg (188 lb 11.2 oz).       BMI is >= 25 and <30. (Overweight) The following options were offered after discussion;: exercise counseling/recommendations      Cardiovascular:      PMI at left midclavicular line. Normal rate. Regular rhythm. Normal S1. Normal S2.       Murmurs: There is no murmur.      No gallop.  No click. No rub.   Pulses:     Intact distal pulses.   Edema:     Peripheral edema absent.              Data Review:   Lab Results   Component Value Date    GLUCOSE 108 (H) 10/24/2024    BUN 16 10/24/2024    CREATININE 1.10 10/24/2024    EGFRIFNONA 115 06/01/2020    BCR 14.5 10/24/2024    K 4.8 10/24/2024    CO2 26.9 10/24/2024    CALCIUM 11.0 (H) 10/24/2024    ALBUMIN 4.5 02/10/2025    AST 25 02/10/2025    ALT 31 02/10/2025     Lab Results   Component Value Date    CHLPL 141 11/14/2023    TRIG 105 11/14/2023    HDL 36 (L) 11/14/2023    LDL 84 11/14/2023      Lab Results   Component Value Date    WBC 6.1 04/23/2025    RBC 4.31 (L) 04/23/2025    HGB 13.3 04/23/2025    HCT 38.7 (L) 04/23/2025    MCV 89.8 04/23/2025     04/23/2025     Lab Results   Component Value Date    TSH 3.48 04/23/2025     Lab Results   Component Value Date    HGBA1C 5.7 04/23/2025     Lab Results   Component Value Date    INR 1.00 07/29/2019    PROTIME 9.6 07/29/2019           Assessment and Plan     Assessment & Plan  Coronary artery disease " involving native coronary artery of native heart without angina pectoris  Coronary Artery Disease (OPTIONAL): Coronary artery disease is stable.  Continue current treatment regimen. Dietary sodium restriction. Weight loss.  Cardiac status will be reassessed in 3 months.  We will discontinue aspirin leave patient on Plavix 75 mg once a day  Orders:    CT Abdomen Pelvis Without Contrast; Future    Hyperlipidemia LDL goal <70   Lipid abnormalities are stable    Plan:  Continue same medication/s without change.      Discussed medication dosage, use, side effects, and goals of treatment in detail.    Counseled patient on lifestyle modifications to help control hyperlipidemia.   Advised patient to exercise for 150 minutes weekly. (30 minute brisk walk, 5 days a week for example)    Patient Treatment Goals:   LDL goal is less than 70    Followup in 3 months.  Continue Lipitor 20 mg once a day  Orders:    CT Abdomen Pelvis Without Contrast; Future    Benign essential hypertension  Continue metoprolol succinate 25 mg once a day and lisinopril 5 mg once a day  Hypertension is stable and controlled  Continue current treatment regimen.  Weight loss.  Ambulatory blood pressure monitoring.  Blood pressure will be reassessed in 3 months.  Orders:    CT Abdomen Pelvis Without Contrast; Future    Enlarged lymph node  Will do CT of the abdomen and pelvis to evaluate the enlarged lymph node in right groin.  The patient also will check blood work with the family doctor for prostate.  Orders:    CT Abdomen Pelvis Without Contrast; Future      Advised to continue current cardiac medications. Please notify of any issues. Discussed with the patient compliance with medical management and follow-up.     Follow Up  Return in about 3 months (around 9/16/2025).    Call if you have any significant symptoms or go to the [facility] Emergency room if possible.     Francis Torres MD, FACC,[facility]  [facility]    Part  of this note may be an electronic transcription/translation of spoken language to printed text using the Dragon Dictation System.

## 2025-06-13 NOTE — ASSESSMENT & PLAN NOTE
Continue metoprolol succinate 25 mg once a day and lisinopril 5 mg once a day  Hypertension is stable and controlled  Continue current treatment regimen.  Weight loss.  Ambulatory blood pressure monitoring.  Blood pressure will be reassessed in 3 months.  Orders:    CT Abdomen Pelvis Without Contrast; Future

## 2025-06-13 NOTE — ASSESSMENT & PLAN NOTE
Lipid abnormalities are stable    Plan:  Continue same medication/s without change.      Discussed medication dosage, use, side effects, and goals of treatment in detail.    Counseled patient on lifestyle modifications to help control hyperlipidemia.   Advised patient to exercise for 150 minutes weekly. (30 minute brisk walk, 5 days a week for example)    Patient Treatment Goals:   LDL goal is less than 70    Followup in 3 months.  Continue Lipitor 20 mg once a day  Orders:    CT Abdomen Pelvis Without Contrast; Future

## 2025-06-16 ENCOUNTER — OFFICE VISIT (OUTPATIENT)
Age: 60
End: 2025-06-16
Payer: COMMERCIAL

## 2025-06-16 ENCOUNTER — PATIENT ROUNDING (BHMG ONLY) (OUTPATIENT)
Age: 60
End: 2025-06-16

## 2025-06-16 VITALS
SYSTOLIC BLOOD PRESSURE: 128 MMHG | HEIGHT: 67 IN | DIASTOLIC BLOOD PRESSURE: 76 MMHG | WEIGHT: 188.7 LBS | HEART RATE: 56 BPM | BODY MASS INDEX: 29.62 KG/M2

## 2025-06-16 DIAGNOSIS — I25.10 CORONARY ARTERY DISEASE INVOLVING NATIVE CORONARY ARTERY OF NATIVE HEART WITHOUT ANGINA PECTORIS: Primary | ICD-10-CM

## 2025-06-16 DIAGNOSIS — E78.5 HYPERLIPIDEMIA LDL GOAL <70: ICD-10-CM

## 2025-06-16 DIAGNOSIS — R59.9 ENLARGED LYMPH NODE: ICD-10-CM

## 2025-06-16 DIAGNOSIS — I10 BENIGN ESSENTIAL HYPERTENSION: ICD-10-CM

## 2025-06-16 PROCEDURE — 3074F SYST BP LT 130 MM HG: CPT | Performed by: INTERNAL MEDICINE

## 2025-06-16 PROCEDURE — 1160F RVW MEDS BY RX/DR IN RCRD: CPT | Performed by: INTERNAL MEDICINE

## 2025-06-16 PROCEDURE — 3078F DIAST BP <80 MM HG: CPT | Performed by: INTERNAL MEDICINE

## 2025-06-16 PROCEDURE — 99214 OFFICE O/P EST MOD 30 MIN: CPT | Performed by: INTERNAL MEDICINE

## 2025-06-16 PROCEDURE — 1159F MED LIST DOCD IN RCRD: CPT | Performed by: INTERNAL MEDICINE

## 2025-06-16 RX ORDER — PREDNISONE 5 MG/1
5 TABLET ORAL DAILY
COMMUNITY

## 2025-06-16 RX ORDER — FINASTERIDE 5 MG/1
1 TABLET, FILM COATED ORAL DAILY
COMMUNITY
Start: 2025-04-30

## 2025-06-16 RX ORDER — DIAZEPAM 10 MG/1
10 TABLET ORAL ONCE
COMMUNITY
Start: 2025-04-30

## 2025-06-16 RX ORDER — OXYCODONE AND ACETAMINOPHEN 10; 325 MG/1; MG/1
1 TABLET ORAL EVERY 6 HOURS
COMMUNITY
Start: 2025-05-01

## 2025-06-16 RX ORDER — FAMOTIDINE 20 MG/1
20 TABLET, FILM COATED ORAL 2 TIMES DAILY
COMMUNITY

## 2025-06-16 NOTE — ASSESSMENT & PLAN NOTE
Will do CT of the abdomen and pelvis to evaluate the enlarged lymph node in right groin.  The patient also will check blood work with the family doctor for prostate.  Orders:    CT Abdomen Pelvis Without Contrast; Future

## 2025-06-17 NOTE — PROGRESS NOTES
My name is Anai Ram, and I am the Practice Manager for [facility] [place].    I would like to thank you for being a loyal patient. If you do not mind, I would like to ask you some questions about your recent visit with us. Please feel free to reply if you wish to provide us with feedback on your visit with our practice.    First, could you tell me what went well with your recent visit?    Secondly, we are always looking for ways to make our patients' experiences even better. Do you have any recommendations on what we can do to improve your experience?    Finally, overall were you satisfied with your visit with us as a [facility] facility?    Over the next few days, you will be receiving a Patient Experience Survey. Please consider taking the survey, as it helps [facility] in improving their patient care.    Thank you for taking the time to answer our questions today.    I hope you have a good day.

## 2025-06-25 ENCOUNTER — HOSPITAL ENCOUNTER (OUTPATIENT)
Dept: CT IMAGING | Facility: HOSPITAL | Age: 60
Discharge: HOME | End: 2025-06-25
Admitting: INTERNAL MEDICINE
Payer: COMMERCIAL

## 2025-06-25 DIAGNOSIS — E78.5 HYPERLIPIDEMIA LDL GOAL <70: ICD-10-CM

## 2025-06-25 DIAGNOSIS — R59.9 ENLARGED LYMPH NODE: ICD-10-CM

## 2025-06-25 DIAGNOSIS — I25.10 CORONARY ARTERY DISEASE INVOLVING NATIVE CORONARY ARTERY OF NATIVE HEART WITHOUT ANGINA PECTORIS: ICD-10-CM

## 2025-06-25 DIAGNOSIS — I10 BENIGN ESSENTIAL HYPERTENSION: ICD-10-CM

## 2025-06-25 PROCEDURE — 74176 CT ABD & PELVIS W/O CONTRAST: CPT

## 2025-07-03 ENCOUNTER — HOSPITAL ENCOUNTER (OUTPATIENT)
Dept: GENERAL RADIOLOGY | Facility: HOSPITAL | Age: 60
Discharge: HOME | End: 2025-07-03
Payer: MEDICAID

## 2025-07-03 ENCOUNTER — HOSPITAL ENCOUNTER (OUTPATIENT)
Facility: HOSPITAL | Age: 60
Discharge: HOME | End: 2025-07-03

## 2025-07-03 ENCOUNTER — HOSPITAL ENCOUNTER (OUTPATIENT)
Facility: HOSPITAL | Age: 60
Discharge: HOME | End: 2025-07-03
Payer: MEDICAID

## 2025-07-03 DIAGNOSIS — N20.0 URIC ACID NEPHROLITHIASIS: ICD-10-CM

## 2025-07-03 LAB
25(OH)D3 SERPL-MCNC: 40.5 NG/ML (ref 32–100)
ALBUMIN SERPL-MCNC: 4.6 G/DL (ref 3.4–4.8)
ALBUMIN/GLOB SERPL: 1.9 {RATIO} (ref 0.8–2)
ALP SERPL-CCNC: 82 U/L (ref 25–100)
ALT SERPL-CCNC: 25 U/L (ref 4–36)
ANION GAP SERPL CALCULATED.3IONS-SCNC: 15 MMOL/L (ref 3–16)
AST SERPL-CCNC: 23 U/L (ref 8–33)
BASOPHILS # BLD: 0.1 K/UL (ref 0–0.1)
BASOPHILS NFR BLD: 0.9 %
BILIRUB SERPL-MCNC: 0.3 MG/DL (ref 0.3–1.2)
BILIRUB UR QL STRIP.AUTO: NEGATIVE
BUN SERPL-MCNC: 11 MG/DL (ref 6–20)
CALCIUM SERPL-MCNC: 9.5 MG/DL (ref 8.3–10.6)
CHLORIDE SERPL-SCNC: 103 MMOL/L (ref 98–107)
CHOLEST SERPL-MCNC: 144 MG/DL (ref 0–200)
CLARITY UR: CLEAR
CO2 SERPL-SCNC: 22 MMOL/L (ref 20–30)
COLOR UR: YELLOW
CREAT SERPL-MCNC: 0.8 MG/DL (ref 0.8–1.3)
EOSINOPHIL # BLD: 0.2 K/UL (ref 0–0.4)
EOSINOPHIL NFR BLD: 2.2 %
ERYTHROCYTE [DISTWIDTH] IN BLOOD BY AUTOMATED COUNT: 13 % (ref 11–16)
FOLATE SERPL-MCNC: >20 NG/ML
GFR SERPLBLD CREATININE-BSD FMLA CKD-EPI: >90 ML/MIN/{1.73_M2}
GLOBULIN SER CALC-MCNC: 2.4 G/DL
GLUCOSE SERPL-MCNC: 109 MG/DL (ref 74–106)
GLUCOSE UR STRIP.AUTO-MCNC: NEGATIVE MG/DL
HBA1C MFR BLD: 5.8 %
HCT VFR BLD AUTO: 41.1 % (ref 40–54)
HDLC SERPL-MCNC: 44 MG/DL (ref 40–60)
HGB BLD-MCNC: 13.6 G/DL (ref 13–18)
HGB UR QL STRIP.AUTO: NEGATIVE
IMM GRANULOCYTES # BLD: 0 K/UL
IMM GRANULOCYTES NFR BLD: 0.3 % (ref 0–5)
KETONES UR STRIP.AUTO-MCNC: NEGATIVE MG/DL
LDLC SERPL CALC-MCNC: 78 MG/DL
LEUKOCYTE ESTERASE UR QL STRIP.AUTO: NEGATIVE
LYMPHOCYTES # BLD: 2.4 K/UL (ref 1.5–4)
LYMPHOCYTES NFR BLD: 26.3 %
MCH RBC QN AUTO: 30.1 PG (ref 27–32)
MCHC RBC AUTO-ENTMCNC: 33.1 G/DL (ref 31–35)
MCV RBC AUTO: 90.9 FL (ref 80–100)
MONOCYTES # BLD: 0.9 K/UL (ref 0.2–0.8)
MONOCYTES NFR BLD: 9.5 %
NEUTROPHILS # BLD: 5.6 K/UL (ref 2–7.5)
NEUTS SEG NFR BLD: 60.8 %
NITRITE UR QL STRIP.AUTO: NEGATIVE
PH UR STRIP.AUTO: 6 [PH] (ref 5–8)
PLATELET # BLD AUTO: 264 K/UL (ref 150–400)
PMV BLD AUTO: 9.3 FL (ref 6–10)
POTASSIUM SERPL-SCNC: 4 MMOL/L (ref 3.4–5.1)
PROT SERPL-MCNC: 7 G/DL (ref 6.4–8.3)
PROT UR STRIP.AUTO-MCNC: NEGATIVE MG/DL
PSA SERPL DL<=0.01 NG/ML-MCNC: 0.65 NG/ML (ref 0–4)
RBC # BLD AUTO: 4.52 M/UL (ref 4.5–6)
SODIUM SERPL-SCNC: 140 MMOL/L (ref 136–145)
SP GR UR STRIP.AUTO: <=1.005 (ref 1–1.03)
TRIGL SERPL-MCNC: 111 MG/DL (ref 0–249)
TSH SERPL DL<=0.005 MIU/L-ACNC: 4 UIU/ML (ref 0.27–4.2)
UA COMPLETE W REFLEX CULTURE PNL UR: NORMAL
UA DIPSTICK W REFLEX MICRO PNL UR: NORMAL
URN SPEC COLLECT METH UR: NORMAL
UROBILINOGEN UR STRIP-ACNC: 0.2 E.U./DL
VIT B12 SERPL-MCNC: 530 PG/ML (ref 211–911)
VLDLC SERPL CALC-MCNC: 22 MG/DL
WBC # BLD AUTO: 9.1 K/UL (ref 4–11)

## 2025-07-03 PROCEDURE — 74018 RADEX ABDOMEN 1 VIEW: CPT

## 2025-07-03 PROCEDURE — 80053 COMPREHEN METABOLIC PANEL: CPT

## 2025-07-03 PROCEDURE — 36415 COLL VENOUS BLD VENIPUNCTURE: CPT

## 2025-07-03 PROCEDURE — 81003 URINALYSIS AUTO W/O SCOPE: CPT

## 2025-07-03 PROCEDURE — 85025 COMPLETE CBC W/AUTO DIFF WBC: CPT

## 2025-07-03 PROCEDURE — 84443 ASSAY THYROID STIM HORMONE: CPT

## 2025-07-03 PROCEDURE — 82607 VITAMIN B-12: CPT

## 2025-07-03 PROCEDURE — 83036 HEMOGLOBIN GLYCOSYLATED A1C: CPT

## 2025-07-03 PROCEDURE — 80061 LIPID PANEL: CPT

## 2025-07-03 PROCEDURE — 82306 VITAMIN D 25 HYDROXY: CPT

## 2025-07-03 PROCEDURE — 84153 ASSAY OF PSA TOTAL: CPT

## 2025-07-03 PROCEDURE — 82746 ASSAY OF FOLIC ACID SERUM: CPT

## 2025-07-11 ENCOUNTER — TRANSCRIBE ORDERS (OUTPATIENT)
Age: 60
End: 2025-07-11
Payer: COMMERCIAL

## 2025-07-11 DIAGNOSIS — I21.09: Primary | ICD-10-CM

## 2025-07-24 RX ORDER — CLOPIDOGREL BISULFATE 75 MG/1
75 TABLET ORAL DAILY
Qty: 90 TABLET | Refills: 1 | Status: SHIPPED | OUTPATIENT
Start: 2025-07-24

## 2025-07-24 NOTE — TELEPHONE ENCOUNTER
Rx Refill Note  Requested Prescriptions     Pending Prescriptions Disp Refills    clopidogrel (PLAVIX) 75 MG tablet [Pharmacy Med Name: CLOPIDOGREL 75MG TABLETS] 90 tablet 1     Sig: Take 1 tablet by mouth Daily.     Per last office note, patient is to continue taking as prescribed. Passed protcool, sent in rx.      Last office visit with prescribing clinician: 06/16/2025   Last telemedicine visit with prescribing clinician: Visit date not found   Next office visit with prescribing clinician: 9/16/2025                        Would you like a call back once the refill request has been completed: [] Yes [] No [] N/A    If the office needs to give you a call back, can they leave a voicemail: [] Yes [] No [] N/A    Pharmacy Info    Last Fill Date:   Rx Written Date:   Prescribed Qty:   Additional Details from Pharmacy:       Carina Howard MA  07/24/25, 09:00 EDT

## 2025-08-04 PROBLEM — E83.52 HYPERCALCEMIA: Status: ACTIVE | Noted: 2025-08-04

## 2025-08-12 ENCOUNTER — OFFICE VISIT (OUTPATIENT)
Dept: ORTHOPEDIC SURGERY | Facility: CLINIC | Age: 60
End: 2025-08-12
Payer: COMMERCIAL

## 2025-08-12 VITALS
BODY MASS INDEX: 30.29 KG/M2 | WEIGHT: 193 LBS | HEIGHT: 67 IN | DIASTOLIC BLOOD PRESSURE: 82 MMHG | SYSTOLIC BLOOD PRESSURE: 128 MMHG

## 2025-08-12 DIAGNOSIS — M75.112 NONTRAUMATIC INCOMPLETE TEAR OF LEFT ROTATOR CUFF: ICD-10-CM

## 2025-08-12 DIAGNOSIS — M06.09 RHEUMATOID ARTHRITIS OF MULTIPLE SITES WITH NEGATIVE RHEUMATOID FACTOR: ICD-10-CM

## 2025-08-12 DIAGNOSIS — M19.012 PRIMARY OSTEOARTHRITIS OF LEFT SHOULDER: ICD-10-CM

## 2025-08-12 DIAGNOSIS — S43.432D SUPERIOR GLENOID LABRUM LESION OF LEFT SHOULDER, SUBSEQUENT ENCOUNTER: ICD-10-CM

## 2025-08-12 DIAGNOSIS — S49.92XD INJURY OF LEFT SHOULDER, SUBSEQUENT ENCOUNTER: Primary | ICD-10-CM

## 2025-08-12 RX ORDER — DEXAMETHASONE SODIUM PHOSPHATE 4 MG/ML
4 INJECTION, SOLUTION INTRA-ARTICULAR; INTRALESIONAL; INTRAMUSCULAR; INTRAVENOUS; SOFT TISSUE
Status: COMPLETED | OUTPATIENT
Start: 2025-08-12 | End: 2025-08-12

## 2025-08-12 RX ORDER — LIDOCAINE HYDROCHLORIDE 10 MG/ML
3 INJECTION, SOLUTION EPIDURAL; INFILTRATION; INTRACAUDAL; PERINEURAL
Status: COMPLETED | OUTPATIENT
Start: 2025-08-12 | End: 2025-08-12

## 2025-08-12 RX ADMIN — DEXAMETHASONE SODIUM PHOSPHATE 4 MG: 4 INJECTION, SOLUTION INTRA-ARTICULAR; INTRALESIONAL; INTRAMUSCULAR; INTRAVENOUS; SOFT TISSUE at 11:12

## 2025-08-12 RX ADMIN — LIDOCAINE HYDROCHLORIDE 3 ML: 10 INJECTION, SOLUTION EPIDURAL; INFILTRATION; INTRACAUDAL; PERINEURAL at 11:12

## 2025-08-13 ENCOUNTER — OFFICE VISIT (OUTPATIENT)
Age: 60
End: 2025-08-13
Payer: COMMERCIAL

## 2025-08-13 VITALS
SYSTOLIC BLOOD PRESSURE: 130 MMHG | BODY MASS INDEX: 29.79 KG/M2 | WEIGHT: 189.8 LBS | HEIGHT: 67 IN | RESPIRATION RATE: 16 BRPM | DIASTOLIC BLOOD PRESSURE: 80 MMHG | TEMPERATURE: 97.5 F | HEART RATE: 72 BPM

## 2025-08-13 DIAGNOSIS — M25.511 CHRONIC PAIN OF BOTH SHOULDERS: ICD-10-CM

## 2025-08-13 DIAGNOSIS — M25.512 CHRONIC PAIN OF BOTH SHOULDERS: ICD-10-CM

## 2025-08-13 DIAGNOSIS — G89.29 CHRONIC PAIN OF BOTH SHOULDERS: ICD-10-CM

## 2025-08-13 DIAGNOSIS — M54.2 NECK PAIN: ICD-10-CM

## 2025-08-13 DIAGNOSIS — M06.09 RHEUMATOID ARTHRITIS OF MULTIPLE SITES WITH NEGATIVE RHEUMATOID FACTOR: Primary | ICD-10-CM

## 2025-08-13 DIAGNOSIS — Z79.52 LONG TERM SYSTEMIC STEROID USER: ICD-10-CM

## 2025-08-13 DIAGNOSIS — M19.042 PRIMARY OSTEOARTHRITIS OF BOTH HANDS: ICD-10-CM

## 2025-08-13 DIAGNOSIS — Z79.899 HIGH RISK MEDICATION USE: ICD-10-CM

## 2025-08-13 DIAGNOSIS — M19.041 PRIMARY OSTEOARTHRITIS OF BOTH HANDS: ICD-10-CM

## 2025-08-13 DIAGNOSIS — R53.82 CHRONIC FATIGUE: ICD-10-CM

## 2025-08-13 DIAGNOSIS — E83.52 HYPERCALCEMIA: ICD-10-CM

## 2025-08-13 RX ORDER — PREDNISONE 5 MG/1
5 TABLET ORAL DAILY
Qty: 90 TABLET | Refills: 1 | Status: SHIPPED | OUTPATIENT
Start: 2025-08-13

## 2025-08-13 RX ORDER — HYDROXYCHLOROQUINE SULFATE 200 MG/1
200 TABLET, FILM COATED ORAL 2 TIMES DAILY
Qty: 60 TABLET | Refills: 4 | Status: SHIPPED | OUTPATIENT
Start: 2025-08-13

## 2025-08-25 ENCOUNTER — LAB (OUTPATIENT)
Facility: HOSPITAL | Age: 60
End: 2025-08-25
Payer: COMMERCIAL

## 2025-08-25 ENCOUNTER — CONSULT (OUTPATIENT)
Age: 60
End: 2025-08-25
Payer: COMMERCIAL

## 2025-08-25 VITALS
OXYGEN SATURATION: 99 % | HEART RATE: 74 BPM | DIASTOLIC BLOOD PRESSURE: 91 MMHG | RESPIRATION RATE: 12 BRPM | WEIGHT: 189 LBS | TEMPERATURE: 97.1 F | HEIGHT: 67 IN | BODY MASS INDEX: 29.66 KG/M2 | SYSTOLIC BLOOD PRESSURE: 169 MMHG

## 2025-08-25 DIAGNOSIS — R59.9 ENLARGED LYMPH NODE: Primary | ICD-10-CM

## 2025-08-25 DIAGNOSIS — R59.9 ENLARGED LYMPH NODE: ICD-10-CM

## 2025-08-25 LAB
ALBUMIN SERPL-MCNC: 4.7 G/DL (ref 3.5–5.2)
ALBUMIN/GLOB SERPL: 1.7 G/DL
ALP SERPL-CCNC: 85 U/L (ref 39–117)
ALT SERPL W P-5'-P-CCNC: 22 U/L (ref 1–41)
ANION GAP SERPL CALCULATED.3IONS-SCNC: 11 MMOL/L (ref 5–15)
AST SERPL-CCNC: 23 U/L (ref 1–40)
BASOPHILS # BLD AUTO: 0.06 10*3/MM3 (ref 0–0.2)
BASOPHILS NFR BLD AUTO: 0.9 % (ref 0–1.5)
BILIRUB SERPL-MCNC: 0.6 MG/DL (ref 0–1.2)
BUN SERPL-MCNC: 13.1 MG/DL (ref 8–23)
BUN/CREAT SERPL: 15.4 (ref 7–25)
CALCIUM SPEC-SCNC: 9.9 MG/DL (ref 8.6–10.5)
CHLORIDE SERPL-SCNC: 102 MMOL/L (ref 98–107)
CO2 SERPL-SCNC: 24 MMOL/L (ref 22–29)
CREAT SERPL-MCNC: 0.85 MG/DL (ref 0.76–1.27)
CRP SERPL-MCNC: 0.47 MG/DL (ref 0–0.5)
DEPRECATED RDW RBC AUTO: 41.7 FL (ref 37–54)
EGFRCR SERPLBLD CKD-EPI 2021: 99.5 ML/MIN/1.73
EOSINOPHIL # BLD AUTO: 0.16 10*3/MM3 (ref 0–0.4)
EOSINOPHIL NFR BLD AUTO: 2.3 % (ref 0.3–6.2)
ERYTHROCYTE [DISTWIDTH] IN BLOOD BY AUTOMATED COUNT: 12.7 % (ref 12.3–15.4)
ERYTHROCYTE [SEDIMENTATION RATE] IN BLOOD: 15 MM/HR (ref 0–20)
GLOBULIN UR ELPH-MCNC: 2.8 GM/DL
GLUCOSE SERPL-MCNC: 104 MG/DL (ref 65–99)
HCT VFR BLD AUTO: 40.6 % (ref 37.5–51)
HGB BLD-MCNC: 14.2 G/DL (ref 13–17.7)
IMM GRANULOCYTES # BLD AUTO: 0.02 10*3/MM3 (ref 0–0.05)
IMM GRANULOCYTES NFR BLD AUTO: 0.3 % (ref 0–0.5)
LDH SERPL-CCNC: 177 U/L (ref 135–225)
LYMPHOCYTES # BLD AUTO: 2.23 10*3/MM3 (ref 0.7–3.1)
LYMPHOCYTES NFR BLD AUTO: 32.3 % (ref 19.6–45.3)
MCH RBC QN AUTO: 30.9 PG (ref 26.6–33)
MCHC RBC AUTO-ENTMCNC: 35 G/DL (ref 31.5–35.7)
MCV RBC AUTO: 88.3 FL (ref 79–97)
MONOCYTES # BLD AUTO: 0.81 10*3/MM3 (ref 0.1–0.9)
MONOCYTES NFR BLD AUTO: 11.7 % (ref 5–12)
NEUTROPHILS NFR BLD AUTO: 3.63 10*3/MM3 (ref 1.7–7)
NEUTROPHILS NFR BLD AUTO: 52.5 % (ref 42.7–76)
PLATELET # BLD AUTO: 250 10*3/MM3 (ref 140–450)
PMV BLD AUTO: 9 FL (ref 6–12)
POTASSIUM SERPL-SCNC: 4.2 MMOL/L (ref 3.5–5.2)
PROT SERPL-MCNC: 7.5 G/DL (ref 6–8.5)
RBC # BLD AUTO: 4.6 10*6/MM3 (ref 4.14–5.8)
SODIUM SERPL-SCNC: 137 MMOL/L (ref 136–145)
WBC NRBC COR # BLD AUTO: 6.91 10*3/MM3 (ref 3.4–10.8)

## 2025-08-25 PROCEDURE — 83615 LACTATE (LD) (LDH) ENZYME: CPT

## 2025-08-25 PROCEDURE — 85025 COMPLETE CBC W/AUTO DIFF WBC: CPT

## 2025-08-25 PROCEDURE — 1126F AMNT PAIN NOTED NONE PRSNT: CPT | Performed by: INTERNAL MEDICINE

## 2025-08-25 PROCEDURE — 3080F DIAST BP >= 90 MM HG: CPT | Performed by: INTERNAL MEDICINE

## 2025-08-25 PROCEDURE — 85652 RBC SED RATE AUTOMATED: CPT

## 2025-08-25 PROCEDURE — 36415 COLL VENOUS BLD VENIPUNCTURE: CPT

## 2025-08-25 PROCEDURE — 3077F SYST BP >= 140 MM HG: CPT | Performed by: INTERNAL MEDICINE

## 2025-08-25 PROCEDURE — 80053 COMPREHEN METABOLIC PANEL: CPT

## 2025-08-25 PROCEDURE — 99204 OFFICE O/P NEW MOD 45 MIN: CPT | Performed by: INTERNAL MEDICINE

## 2025-08-25 PROCEDURE — 86140 C-REACTIVE PROTEIN: CPT
